# Patient Record
Sex: FEMALE | Race: WHITE | Employment: OTHER | URBAN - METROPOLITAN AREA
[De-identification: names, ages, dates, MRNs, and addresses within clinical notes are randomized per-mention and may not be internally consistent; named-entity substitution may affect disease eponyms.]

---

## 2017-01-19 DIAGNOSIS — Z11.59 NEED FOR HEPATITIS C SCREENING TEST: ICD-10-CM

## 2017-01-19 DIAGNOSIS — E11.9 TYPE 2 DIABETES MELLITUS WITHOUT COMPLICATION, WITHOUT LONG-TERM CURRENT USE OF INSULIN (HCC): ICD-10-CM

## 2017-01-19 LAB
CREATININE URINE: 109.9 MG/DL
HBA1C MFR BLD: 6.2 % (ref 4.8–5.9)
HEPATITIS C ANTIBODY INTERPRETATION: NORMAL
MICROALBUMIN UR-MCNC: <1.2 MG/DL
MICROALBUMIN/CREAT UR-RTO: NORMAL MG/G (ref 0–30)

## 2017-02-28 DIAGNOSIS — Z12.31 ENCOUNTER FOR SCREENING MAMMOGRAM FOR BREAST CANCER: Primary | ICD-10-CM

## 2017-03-03 ENCOUNTER — OFFICE VISIT (OUTPATIENT)
Dept: FAMILY MEDICINE CLINIC | Age: 72
End: 2017-03-03

## 2017-03-03 VITALS
BODY MASS INDEX: 31.08 KG/M2 | RESPIRATION RATE: 18 BRPM | SYSTOLIC BLOOD PRESSURE: 128 MMHG | DIASTOLIC BLOOD PRESSURE: 78 MMHG | HEIGHT: 67 IN | TEMPERATURE: 97.2 F | HEART RATE: 72 BPM | WEIGHT: 198 LBS

## 2017-03-03 DIAGNOSIS — Z23 NEED FOR VACCINATION WITH 13-POLYVALENT PNEUMOCOCCAL CONJUGATE VACCINE: ICD-10-CM

## 2017-03-03 DIAGNOSIS — E11.40 NEUROPATHY DUE TO TYPE 2 DIABETES MELLITUS (HCC): Primary | ICD-10-CM

## 2017-03-03 PROCEDURE — 4040F PNEUMOC VAC/ADMIN/RCVD: CPT | Performed by: FAMILY MEDICINE

## 2017-03-03 PROCEDURE — 1123F ACP DISCUSS/DSCN MKR DOCD: CPT | Performed by: FAMILY MEDICINE

## 2017-03-03 PROCEDURE — 90670 PCV13 VACCINE IM: CPT | Performed by: FAMILY MEDICINE

## 2017-03-03 PROCEDURE — 3014F SCREEN MAMMO DOC REV: CPT | Performed by: FAMILY MEDICINE

## 2017-03-03 PROCEDURE — G8399 PT W/DXA RESULTS DOCUMENT: HCPCS | Performed by: FAMILY MEDICINE

## 2017-03-03 PROCEDURE — G0009 ADMIN PNEUMOCOCCAL VACCINE: HCPCS | Performed by: FAMILY MEDICINE

## 2017-03-03 PROCEDURE — G8417 CALC BMI ABV UP PARAM F/U: HCPCS | Performed by: FAMILY MEDICINE

## 2017-03-03 PROCEDURE — 3044F HG A1C LEVEL LT 7.0%: CPT | Performed by: FAMILY MEDICINE

## 2017-03-03 PROCEDURE — 99213 OFFICE O/P EST LOW 20 MIN: CPT | Performed by: FAMILY MEDICINE

## 2017-03-03 PROCEDURE — 1090F PRES/ABSN URINE INCON ASSESS: CPT | Performed by: FAMILY MEDICINE

## 2017-03-03 PROCEDURE — G8427 DOCREV CUR MEDS BY ELIG CLIN: HCPCS | Performed by: FAMILY MEDICINE

## 2017-03-03 PROCEDURE — 1036F TOBACCO NON-USER: CPT | Performed by: FAMILY MEDICINE

## 2017-03-03 PROCEDURE — 3017F COLORECTAL CA SCREEN DOC REV: CPT | Performed by: FAMILY MEDICINE

## 2017-03-03 PROCEDURE — G8484 FLU IMMUNIZE NO ADMIN: HCPCS | Performed by: FAMILY MEDICINE

## 2017-03-03 RX ORDER — PREGABALIN 75 MG/1
75 CAPSULE ORAL 2 TIMES DAILY
Qty: 60 CAPSULE | Refills: 3 | Status: SHIPPED | OUTPATIENT
Start: 2017-03-03 | End: 2017-03-30 | Stop reason: SDUPTHER

## 2017-03-15 DIAGNOSIS — Z12.31 ENCOUNTER FOR SCREENING MAMMOGRAM FOR BREAST CANCER: ICD-10-CM

## 2017-03-30 ENCOUNTER — OFFICE VISIT (OUTPATIENT)
Dept: FAMILY MEDICINE CLINIC | Age: 72
End: 2017-03-30

## 2017-03-30 VITALS
RESPIRATION RATE: 18 BRPM | BODY MASS INDEX: 31.71 KG/M2 | SYSTOLIC BLOOD PRESSURE: 122 MMHG | WEIGHT: 202 LBS | TEMPERATURE: 97.6 F | HEART RATE: 78 BPM | HEIGHT: 67 IN | DIASTOLIC BLOOD PRESSURE: 70 MMHG

## 2017-03-30 DIAGNOSIS — E03.9 ACQUIRED HYPOTHYROIDISM: ICD-10-CM

## 2017-03-30 DIAGNOSIS — E11.40 NEUROPATHY DUE TO TYPE 2 DIABETES MELLITUS (HCC): Primary | ICD-10-CM

## 2017-03-30 DIAGNOSIS — E11.42 TYPE 2 DIABETES MELLITUS WITH DIABETIC POLYNEUROPATHY, WITHOUT LONG-TERM CURRENT USE OF INSULIN (HCC): ICD-10-CM

## 2017-03-30 DIAGNOSIS — E53.8 VITAMIN B 12 DEFICIENCY: ICD-10-CM

## 2017-03-30 PROCEDURE — 3044F HG A1C LEVEL LT 7.0%: CPT | Performed by: FAMILY MEDICINE

## 2017-03-30 PROCEDURE — 1090F PRES/ABSN URINE INCON ASSESS: CPT | Performed by: FAMILY MEDICINE

## 2017-03-30 PROCEDURE — 3014F SCREEN MAMMO DOC REV: CPT | Performed by: FAMILY MEDICINE

## 2017-03-30 PROCEDURE — 1123F ACP DISCUSS/DSCN MKR DOCD: CPT | Performed by: FAMILY MEDICINE

## 2017-03-30 PROCEDURE — 4040F PNEUMOC VAC/ADMIN/RCVD: CPT | Performed by: FAMILY MEDICINE

## 2017-03-30 PROCEDURE — G8417 CALC BMI ABV UP PARAM F/U: HCPCS | Performed by: FAMILY MEDICINE

## 2017-03-30 PROCEDURE — G8484 FLU IMMUNIZE NO ADMIN: HCPCS | Performed by: FAMILY MEDICINE

## 2017-03-30 PROCEDURE — G8399 PT W/DXA RESULTS DOCUMENT: HCPCS | Performed by: FAMILY MEDICINE

## 2017-03-30 PROCEDURE — 3017F COLORECTAL CA SCREEN DOC REV: CPT | Performed by: FAMILY MEDICINE

## 2017-03-30 PROCEDURE — G8427 DOCREV CUR MEDS BY ELIG CLIN: HCPCS | Performed by: FAMILY MEDICINE

## 2017-03-30 PROCEDURE — 1036F TOBACCO NON-USER: CPT | Performed by: FAMILY MEDICINE

## 2017-03-30 PROCEDURE — 99213 OFFICE O/P EST LOW 20 MIN: CPT | Performed by: FAMILY MEDICINE

## 2017-03-30 RX ORDER — PREGABALIN 150 MG/1
150 CAPSULE ORAL 2 TIMES DAILY
Qty: 30 CAPSULE | Refills: 0 | Status: SHIPPED | OUTPATIENT
Start: 2017-03-30 | End: 2017-07-17 | Stop reason: SDUPTHER

## 2017-03-30 RX ORDER — PREGABALIN 150 MG/1
150 CAPSULE ORAL 2 TIMES DAILY
Qty: 180 CAPSULE | Refills: 3 | Status: SHIPPED | OUTPATIENT
Start: 2017-03-30 | End: 2018-01-08 | Stop reason: SDUPTHER

## 2017-06-23 DIAGNOSIS — E11.40 NEUROPATHY DUE TO TYPE 2 DIABETES MELLITUS (HCC): ICD-10-CM

## 2017-06-23 DIAGNOSIS — E11.42 TYPE 2 DIABETES MELLITUS WITH DIABETIC POLYNEUROPATHY, WITHOUT LONG-TERM CURRENT USE OF INSULIN (HCC): ICD-10-CM

## 2017-06-23 DIAGNOSIS — E03.9 ACQUIRED HYPOTHYROIDISM: ICD-10-CM

## 2017-06-23 DIAGNOSIS — E53.8 VITAMIN B 12 DEFICIENCY: ICD-10-CM

## 2017-06-23 LAB
ALBUMIN SERPL-MCNC: 4.1 G/DL (ref 3.9–4.9)
ALP BLD-CCNC: 90 U/L (ref 40–130)
ALT SERPL-CCNC: 20 U/L (ref 0–33)
ANION GAP SERPL CALCULATED.3IONS-SCNC: 13 MEQ/L (ref 7–13)
AST SERPL-CCNC: 20 U/L (ref 0–35)
BILIRUB SERPL-MCNC: 0.4 MG/DL (ref 0–1.2)
BUN BLDV-MCNC: 17 MG/DL (ref 8–23)
CALCIUM SERPL-MCNC: 9.3 MG/DL (ref 8.6–10.2)
CHLORIDE BLD-SCNC: 101 MEQ/L (ref 98–107)
CHOLESTEROL, TOTAL: 206 MG/DL (ref 0–199)
CO2: 24 MEQ/L (ref 22–29)
CREAT SERPL-MCNC: 0.59 MG/DL (ref 0.5–0.9)
CREATININE URINE: 163.2 MG/DL
GFR AFRICAN AMERICAN: >60
GFR NON-AFRICAN AMERICAN: >60
GLOBULIN: 2.3 G/DL (ref 2.3–3.5)
GLUCOSE BLD-MCNC: 126 MG/DL (ref 74–109)
HBA1C MFR BLD: 6.3 % (ref 4.8–5.9)
HDLC SERPL-MCNC: 45 MG/DL (ref 40–59)
LDL CHOLESTEROL CALCULATED: 126 MG/DL (ref 0–129)
MICROALBUMIN UR-MCNC: <1.2 MG/DL
MICROALBUMIN/CREAT UR-RTO: NORMAL MG/G (ref 0–30)
POTASSIUM SERPL-SCNC: 4.5 MEQ/L (ref 3.5–5.1)
SODIUM BLD-SCNC: 138 MEQ/L (ref 132–144)
T4 FREE: 1.39 NG/DL (ref 0.93–1.7)
TOTAL PROTEIN: 6.4 G/DL (ref 6.4–8.1)
TRIGL SERPL-MCNC: 174 MG/DL (ref 0–200)
TSH SERPL DL<=0.05 MIU/L-ACNC: 2.64 UIU/ML (ref 0.27–4.2)
VITAMIN B-12: 859 PG/ML (ref 211–946)

## 2017-07-17 DIAGNOSIS — E11.40 NEUROPATHY DUE TO TYPE 2 DIABETES MELLITUS (HCC): ICD-10-CM

## 2017-07-17 RX ORDER — PREGABALIN 150 MG/1
150 CAPSULE ORAL 2 TIMES DAILY
Qty: 12 CAPSULE | Refills: 0 | Status: SHIPPED | OUTPATIENT
Start: 2017-07-17 | End: 2018-01-08

## 2017-07-26 RX ORDER — LEVOTHYROXINE SODIUM 88 UG/1
TABLET ORAL
Qty: 90 TABLET | Refills: 3 | Status: SHIPPED | OUTPATIENT
Start: 2017-07-26 | End: 2018-07-16 | Stop reason: SDUPTHER

## 2017-08-03 DIAGNOSIS — Z78.0 POSTMENOPAUSAL: Primary | ICD-10-CM

## 2017-08-07 ENCOUNTER — HOSPITAL ENCOUNTER (OUTPATIENT)
Dept: GENERAL RADIOLOGY | Age: 72
Discharge: HOME | End: 2017-08-07
Payer: MEDICARE

## 2017-08-07 DIAGNOSIS — Z78.0 POSTMENOPAUSAL: ICD-10-CM

## 2017-08-07 PROCEDURE — 77080 DXA BONE DENSITY AXIAL: CPT

## 2017-12-21 ENCOUNTER — TELEPHONE (OUTPATIENT)
Dept: FAMILY MEDICINE CLINIC | Age: 72
End: 2017-12-21

## 2017-12-21 DIAGNOSIS — E11.42 TYPE 2 DIABETES MELLITUS WITH DIABETIC POLYNEUROPATHY, WITHOUT LONG-TERM CURRENT USE OF INSULIN (HCC): Primary | ICD-10-CM

## 2017-12-22 NOTE — TELEPHONE ENCOUNTER
90 day supply sent in on, patient hasn't been seen since March and needs to be seen her to the end of the current 90 day supply.

## 2018-01-08 ENCOUNTER — TELEPHONE (OUTPATIENT)
Dept: FAMILY MEDICINE CLINIC | Age: 73
End: 2018-01-08

## 2018-01-08 DIAGNOSIS — E11.40 NEUROPATHY DUE TO TYPE 2 DIABETES MELLITUS (HCC): ICD-10-CM

## 2018-01-08 RX ORDER — PREGABALIN 150 MG/1
150 CAPSULE ORAL 2 TIMES DAILY
Qty: 180 CAPSULE | Refills: 1 | Status: SHIPPED | OUTPATIENT
Start: 2018-01-08 | End: 2018-07-10 | Stop reason: SDUPTHER

## 2018-01-11 ENCOUNTER — OFFICE VISIT (OUTPATIENT)
Dept: FAMILY MEDICINE CLINIC | Age: 73
End: 2018-01-11

## 2018-01-11 VITALS
DIASTOLIC BLOOD PRESSURE: 68 MMHG | HEIGHT: 67 IN | HEART RATE: 72 BPM | BODY MASS INDEX: 32.33 KG/M2 | WEIGHT: 206 LBS | SYSTOLIC BLOOD PRESSURE: 112 MMHG | RESPIRATION RATE: 12 BRPM | TEMPERATURE: 97.8 F

## 2018-01-11 DIAGNOSIS — E11.42 TYPE 2 DIABETES MELLITUS WITH DIABETIC POLYNEUROPATHY, WITHOUT LONG-TERM CURRENT USE OF INSULIN (HCC): Primary | ICD-10-CM

## 2018-01-11 DIAGNOSIS — M54.12 C6 RADICULOPATHY: ICD-10-CM

## 2018-01-11 DIAGNOSIS — E03.9 ACQUIRED HYPOTHYROIDISM: ICD-10-CM

## 2018-01-11 DIAGNOSIS — E11.42 TYPE 2 DIABETES MELLITUS WITH DIABETIC POLYNEUROPATHY, WITHOUT LONG-TERM CURRENT USE OF INSULIN (HCC): ICD-10-CM

## 2018-01-11 DIAGNOSIS — E53.8 VITAMIN B 12 DEFICIENCY: ICD-10-CM

## 2018-01-11 DIAGNOSIS — K63.5 POLYP OF COLON, UNSPECIFIED PART OF COLON, UNSPECIFIED TYPE: ICD-10-CM

## 2018-01-11 LAB
ALBUMIN SERPL-MCNC: 4.3 G/DL (ref 3.9–4.9)
ALP BLD-CCNC: 92 U/L (ref 40–130)
ALT SERPL-CCNC: 14 U/L (ref 0–33)
ANION GAP SERPL CALCULATED.3IONS-SCNC: 16 MEQ/L (ref 7–13)
AST SERPL-CCNC: 17 U/L (ref 0–35)
BASOPHILS ABSOLUTE: 0 K/UL (ref 0–0.2)
BASOPHILS RELATIVE PERCENT: 0.5 %
BILIRUB SERPL-MCNC: 0.3 MG/DL (ref 0–1.2)
BUN BLDV-MCNC: 15 MG/DL (ref 8–23)
CALCIUM SERPL-MCNC: 9.3 MG/DL (ref 8.6–10.2)
CHLORIDE BLD-SCNC: 103 MEQ/L (ref 98–107)
CHOLESTEROL, TOTAL: 199 MG/DL (ref 0–199)
CO2: 25 MEQ/L (ref 22–29)
CREAT SERPL-MCNC: 0.64 MG/DL (ref 0.5–0.9)
CREATININE URINE: 117.7 MG/DL
EOSINOPHILS ABSOLUTE: 0.1 K/UL (ref 0–0.7)
EOSINOPHILS RELATIVE PERCENT: 1.9 %
GFR AFRICAN AMERICAN: >60
GFR NON-AFRICAN AMERICAN: >60
GLOBULIN: 2.5 G/DL (ref 2.3–3.5)
GLUCOSE BLD-MCNC: 113 MG/DL (ref 74–109)
HBA1C MFR BLD: 6 % (ref 4.8–5.9)
HCT VFR BLD CALC: 40.6 % (ref 37–47)
HDLC SERPL-MCNC: 45 MG/DL (ref 40–59)
HEMOGLOBIN: 13.5 G/DL (ref 12–16)
LDL CHOLESTEROL CALCULATED: 120 MG/DL (ref 0–129)
LYMPHOCYTES ABSOLUTE: 2.6 K/UL (ref 1–4.8)
LYMPHOCYTES RELATIVE PERCENT: 32.2 %
MCH RBC QN AUTO: 28.6 PG (ref 27–31.3)
MCHC RBC AUTO-ENTMCNC: 33.4 % (ref 33–37)
MCV RBC AUTO: 85.6 FL (ref 82–100)
MICROALBUMIN UR-MCNC: <1.2 MG/DL
MICROALBUMIN/CREAT UR-RTO: NORMAL MG/G (ref 0–30)
MONOCYTES ABSOLUTE: 0.8 K/UL (ref 0.2–0.8)
MONOCYTES RELATIVE PERCENT: 9.7 %
NEUTROPHILS ABSOLUTE: 4.4 K/UL (ref 1.4–6.5)
NEUTROPHILS RELATIVE PERCENT: 55.7 %
PDW BLD-RTO: 14 % (ref 11.5–14.5)
PLATELET # BLD: 211 K/UL (ref 130–400)
POTASSIUM SERPL-SCNC: 4.9 MEQ/L (ref 3.5–5.1)
RBC # BLD: 4.74 M/UL (ref 4.2–5.4)
SODIUM BLD-SCNC: 144 MEQ/L (ref 132–144)
T4 FREE: 1.42 NG/DL (ref 0.93–1.7)
TOTAL PROTEIN: 6.8 G/DL (ref 6.4–8.1)
TRIGL SERPL-MCNC: 171 MG/DL (ref 0–200)
TSH SERPL DL<=0.05 MIU/L-ACNC: 3.23 UIU/ML (ref 0.27–4.2)
VITAMIN B-12: 1418 PG/ML (ref 211–946)
WBC # BLD: 7.9 K/UL (ref 4.8–10.8)

## 2018-01-11 PROCEDURE — G8399 PT W/DXA RESULTS DOCUMENT: HCPCS | Performed by: FAMILY MEDICINE

## 2018-01-11 PROCEDURE — G8417 CALC BMI ABV UP PARAM F/U: HCPCS | Performed by: FAMILY MEDICINE

## 2018-01-11 PROCEDURE — 1123F ACP DISCUSS/DSCN MKR DOCD: CPT | Performed by: FAMILY MEDICINE

## 2018-01-11 PROCEDURE — 99214 OFFICE O/P EST MOD 30 MIN: CPT | Performed by: FAMILY MEDICINE

## 2018-01-11 PROCEDURE — 3044F HG A1C LEVEL LT 7.0%: CPT | Performed by: FAMILY MEDICINE

## 2018-01-11 PROCEDURE — 4040F PNEUMOC VAC/ADMIN/RCVD: CPT | Performed by: FAMILY MEDICINE

## 2018-01-11 PROCEDURE — 3014F SCREEN MAMMO DOC REV: CPT | Performed by: FAMILY MEDICINE

## 2018-01-11 PROCEDURE — 3017F COLORECTAL CA SCREEN DOC REV: CPT | Performed by: FAMILY MEDICINE

## 2018-01-11 PROCEDURE — G8427 DOCREV CUR MEDS BY ELIG CLIN: HCPCS | Performed by: FAMILY MEDICINE

## 2018-01-11 PROCEDURE — G8484 FLU IMMUNIZE NO ADMIN: HCPCS | Performed by: FAMILY MEDICINE

## 2018-01-11 PROCEDURE — 1090F PRES/ABSN URINE INCON ASSESS: CPT | Performed by: FAMILY MEDICINE

## 2018-01-11 PROCEDURE — 1036F TOBACCO NON-USER: CPT | Performed by: FAMILY MEDICINE

## 2018-01-11 RX ORDER — MECLIZINE HYDROCHLORIDE 25 MG/1
TABLET ORAL
Refills: 0 | COMMUNITY
Start: 2018-01-03 | End: 2020-12-31 | Stop reason: ALTCHOICE

## 2018-01-11 ASSESSMENT — PATIENT HEALTH QUESTIONNAIRE - PHQ9
1. LITTLE INTEREST OR PLEASURE IN DOING THINGS: 0
2. FEELING DOWN, DEPRESSED OR HOPELESS: 1
SUM OF ALL RESPONSES TO PHQ9 QUESTIONS 1 & 2: 1
SUM OF ALL RESPONSES TO PHQ QUESTIONS 1-9: 1

## 2018-01-11 NOTE — PROGRESS NOTES
diabetic polyneuropathy, without long-term current use of insulin (HCC)  CBC Auto Differential    Comprehensive Metabolic Panel    Hemoglobin A1C    Lipid Panel    Microalbumin / Creatinine Urine Ratio    CBC Auto Differential    Comprehensive Metabolic Panel    Lipid Panel    Hemoglobin A1C    Microalbumin / Creatinine Urine Ratio    Well controlled, continue current medication. 2. Acquired hypothyroidism  T4, Free    TSH without Reflex    TSH without Reflex    T4, Free    Well controlled, continue current medication. 3. Vitamin B 12 deficiency  Vitamin B12    Vitamin B12    Continue with current supplementation, apparently asymptomatic. 4. C6 radiculopathy      Present, continue to treat. 5. Polyp of colon, unspecified part of colon, unspecified type  Ambulatory referral to Gastroenterology    Patient due for colonoscopy. Plan for follow up: Follow up in 4 months with blood work as ordered. Other follow up as needed.       Electronically signed by Imtiaz Perkins, 8:30 PM 1/14/18

## 2018-01-14 PROBLEM — E11.40 NEUROPATHY DUE TO TYPE 2 DIABETES MELLITUS (HCC): Status: RESOLVED | Noted: 2017-03-03 | Resolved: 2018-01-14

## 2018-03-10 DIAGNOSIS — E11.42 TYPE 2 DIABETES MELLITUS WITH DIABETIC POLYNEUROPATHY, WITHOUT LONG-TERM CURRENT USE OF INSULIN (HCC): ICD-10-CM

## 2018-04-09 DIAGNOSIS — Z12.31 ENCOUNTER FOR SCREENING MAMMOGRAM FOR BREAST CANCER: Primary | ICD-10-CM

## 2018-04-11 DIAGNOSIS — Z12.31 ENCOUNTER FOR SCREENING MAMMOGRAM FOR BREAST CANCER: ICD-10-CM

## 2018-04-20 ENCOUNTER — OFFICE VISIT (OUTPATIENT)
Dept: FAMILY MEDICINE CLINIC | Age: 73
End: 2018-04-20
Payer: MEDICARE

## 2018-04-20 VITALS
SYSTOLIC BLOOD PRESSURE: 130 MMHG | RESPIRATION RATE: 12 BRPM | WEIGHT: 211.3 LBS | HEIGHT: 67 IN | DIASTOLIC BLOOD PRESSURE: 76 MMHG | HEART RATE: 63 BPM | TEMPERATURE: 98.4 F | OXYGEN SATURATION: 97 % | BODY MASS INDEX: 33.16 KG/M2

## 2018-04-20 DIAGNOSIS — K29.00 OTHER ACUTE GASTRITIS WITHOUT HEMORRHAGE: Primary | ICD-10-CM

## 2018-04-20 PROCEDURE — 4040F PNEUMOC VAC/ADMIN/RCVD: CPT | Performed by: FAMILY MEDICINE

## 2018-04-20 PROCEDURE — 3017F COLORECTAL CA SCREEN DOC REV: CPT | Performed by: FAMILY MEDICINE

## 2018-04-20 PROCEDURE — G8417 CALC BMI ABV UP PARAM F/U: HCPCS | Performed by: FAMILY MEDICINE

## 2018-04-20 PROCEDURE — 1090F PRES/ABSN URINE INCON ASSESS: CPT | Performed by: FAMILY MEDICINE

## 2018-04-20 PROCEDURE — 1036F TOBACCO NON-USER: CPT | Performed by: FAMILY MEDICINE

## 2018-04-20 PROCEDURE — 99213 OFFICE O/P EST LOW 20 MIN: CPT | Performed by: FAMILY MEDICINE

## 2018-04-20 PROCEDURE — 1123F ACP DISCUSS/DSCN MKR DOCD: CPT | Performed by: FAMILY MEDICINE

## 2018-04-20 PROCEDURE — G8399 PT W/DXA RESULTS DOCUMENT: HCPCS | Performed by: FAMILY MEDICINE

## 2018-04-20 PROCEDURE — G8427 DOCREV CUR MEDS BY ELIG CLIN: HCPCS | Performed by: FAMILY MEDICINE

## 2018-04-20 RX ORDER — PANTOPRAZOLE SODIUM 40 MG/1
40 TABLET, DELAYED RELEASE ORAL DAILY
Qty: 30 TABLET | Refills: 1 | Status: SHIPPED | OUTPATIENT
Start: 2018-04-20 | End: 2018-06-23 | Stop reason: ALTCHOICE

## 2018-05-03 ENCOUNTER — OFFICE VISIT (OUTPATIENT)
Dept: FAMILY MEDICINE CLINIC | Age: 73
End: 2018-05-03
Payer: MEDICARE

## 2018-05-03 VITALS
RESPIRATION RATE: 12 BRPM | BODY MASS INDEX: 32.71 KG/M2 | SYSTOLIC BLOOD PRESSURE: 112 MMHG | DIASTOLIC BLOOD PRESSURE: 70 MMHG | TEMPERATURE: 98.6 F | HEART RATE: 70 BPM | WEIGHT: 208.4 LBS | HEIGHT: 67 IN | OXYGEN SATURATION: 97 %

## 2018-05-03 DIAGNOSIS — E11.42 TYPE 2 DIABETES MELLITUS WITH DIABETIC POLYNEUROPATHY, WITHOUT LONG-TERM CURRENT USE OF INSULIN (HCC): Primary | ICD-10-CM

## 2018-05-03 DIAGNOSIS — R60.9 PERIPHERAL EDEMA: ICD-10-CM

## 2018-05-03 DIAGNOSIS — E03.9 ACQUIRED HYPOTHYROIDISM: ICD-10-CM

## 2018-05-03 DIAGNOSIS — K21.9 GASTROESOPHAGEAL REFLUX DISEASE WITHOUT ESOPHAGITIS: ICD-10-CM

## 2018-05-03 PROCEDURE — G8427 DOCREV CUR MEDS BY ELIG CLIN: HCPCS | Performed by: FAMILY MEDICINE

## 2018-05-03 PROCEDURE — 1123F ACP DISCUSS/DSCN MKR DOCD: CPT | Performed by: FAMILY MEDICINE

## 2018-05-03 PROCEDURE — G8417 CALC BMI ABV UP PARAM F/U: HCPCS | Performed by: FAMILY MEDICINE

## 2018-05-03 PROCEDURE — 3017F COLORECTAL CA SCREEN DOC REV: CPT | Performed by: FAMILY MEDICINE

## 2018-05-03 PROCEDURE — G8399 PT W/DXA RESULTS DOCUMENT: HCPCS | Performed by: FAMILY MEDICINE

## 2018-05-03 PROCEDURE — 4040F PNEUMOC VAC/ADMIN/RCVD: CPT | Performed by: FAMILY MEDICINE

## 2018-05-03 PROCEDURE — 3044F HG A1C LEVEL LT 7.0%: CPT | Performed by: FAMILY MEDICINE

## 2018-05-03 PROCEDURE — 2022F DILAT RTA XM EVC RTNOPTHY: CPT | Performed by: FAMILY MEDICINE

## 2018-05-03 PROCEDURE — 1090F PRES/ABSN URINE INCON ASSESS: CPT | Performed by: FAMILY MEDICINE

## 2018-05-03 PROCEDURE — 1036F TOBACCO NON-USER: CPT | Performed by: FAMILY MEDICINE

## 2018-05-03 PROCEDURE — 99214 OFFICE O/P EST MOD 30 MIN: CPT | Performed by: FAMILY MEDICINE

## 2018-05-03 ASSESSMENT — PATIENT HEALTH QUESTIONNAIRE - PHQ9
1. LITTLE INTEREST OR PLEASURE IN DOING THINGS: 0
2. FEELING DOWN, DEPRESSED OR HOPELESS: 0
SUM OF ALL RESPONSES TO PHQ QUESTIONS 1-9: 0
SUM OF ALL RESPONSES TO PHQ9 QUESTIONS 1 & 2: 0

## 2018-05-05 ASSESSMENT — ENCOUNTER SYMPTOMS
DIARRHEA: 0
BLOOD IN STOOL: 0
SHORTNESS OF BREATH: 0
VOMITING: 0
TROUBLE SWALLOWING: 0
NAUSEA: 0
CONSTIPATION: 0
COUGH: 0
CHEST TIGHTNESS: 0
ABDOMINAL PAIN: 0

## 2018-06-23 ENCOUNTER — OFFICE VISIT (OUTPATIENT)
Dept: FAMILY MEDICINE CLINIC | Age: 73
End: 2018-06-23
Payer: MEDICARE

## 2018-06-23 VITALS
WEIGHT: 208.4 LBS | SYSTOLIC BLOOD PRESSURE: 130 MMHG | TEMPERATURE: 96.7 F | RESPIRATION RATE: 16 BRPM | OXYGEN SATURATION: 97 % | DIASTOLIC BLOOD PRESSURE: 74 MMHG | HEIGHT: 67 IN | HEART RATE: 72 BPM | BODY MASS INDEX: 32.71 KG/M2

## 2018-06-23 DIAGNOSIS — M54.31 SCIATICA OF RIGHT SIDE: Primary | ICD-10-CM

## 2018-06-23 DIAGNOSIS — K29.00 OTHER ACUTE GASTRITIS WITHOUT HEMORRHAGE: ICD-10-CM

## 2018-06-23 PROCEDURE — G8417 CALC BMI ABV UP PARAM F/U: HCPCS | Performed by: NURSE PRACTITIONER

## 2018-06-23 PROCEDURE — G8427 DOCREV CUR MEDS BY ELIG CLIN: HCPCS | Performed by: NURSE PRACTITIONER

## 2018-06-23 PROCEDURE — 4040F PNEUMOC VAC/ADMIN/RCVD: CPT | Performed by: NURSE PRACTITIONER

## 2018-06-23 PROCEDURE — G8399 PT W/DXA RESULTS DOCUMENT: HCPCS | Performed by: NURSE PRACTITIONER

## 2018-06-23 PROCEDURE — 99213 OFFICE O/P EST LOW 20 MIN: CPT | Performed by: NURSE PRACTITIONER

## 2018-06-23 PROCEDURE — 1123F ACP DISCUSS/DSCN MKR DOCD: CPT | Performed by: NURSE PRACTITIONER

## 2018-06-23 PROCEDURE — 1090F PRES/ABSN URINE INCON ASSESS: CPT | Performed by: NURSE PRACTITIONER

## 2018-06-23 PROCEDURE — 1036F TOBACCO NON-USER: CPT | Performed by: NURSE PRACTITIONER

## 2018-06-23 PROCEDURE — 3017F COLORECTAL CA SCREEN DOC REV: CPT | Performed by: NURSE PRACTITIONER

## 2018-06-23 RX ORDER — PREDNISONE 20 MG/1
TABLET ORAL
Qty: 3 TABLET | Refills: 0 | Status: SHIPPED | OUTPATIENT
Start: 2018-06-23 | End: 2018-08-14 | Stop reason: ALTCHOICE

## 2018-06-23 RX ORDER — PANTOPRAZOLE SODIUM 40 MG/1
40 TABLET, DELAYED RELEASE ORAL DAILY
Qty: 30 TABLET | Refills: 1 | Status: SHIPPED | OUTPATIENT
Start: 2018-06-23 | End: 2019-01-22 | Stop reason: SDUPTHER

## 2018-06-23 RX ORDER — BACLOFEN 10 MG/1
5 TABLET ORAL 3 TIMES DAILY PRN
Qty: 5 TABLET | Refills: 0 | Status: SHIPPED | OUTPATIENT
Start: 2018-06-23 | End: 2020-12-31 | Stop reason: ALTCHOICE

## 2018-06-23 ASSESSMENT — ENCOUNTER SYMPTOMS: BACK PAIN: 1

## 2018-07-10 DIAGNOSIS — E11.40 NEUROPATHY DUE TO TYPE 2 DIABETES MELLITUS (HCC): ICD-10-CM

## 2018-07-12 RX ORDER — PREGABALIN 150 MG/1
150 CAPSULE ORAL 2 TIMES DAILY
Qty: 180 CAPSULE | Refills: 1 | Status: SHIPPED | OUTPATIENT
Start: 2018-07-12 | End: 2019-01-22 | Stop reason: SDUPTHER

## 2018-07-17 RX ORDER — LEVOTHYROXINE SODIUM 88 UG/1
TABLET ORAL
Qty: 90 TABLET | Refills: 0 | Status: SHIPPED | OUTPATIENT
Start: 2018-07-17 | End: 2018-10-16 | Stop reason: SDUPTHER

## 2018-07-17 NOTE — TELEPHONE ENCOUNTER
PHARMACY REQUESTING REFILL PATIENT LAST SEEN BY YOU 5/3/18  PLEASE APPROVE OR DENY.      Future Appointments  Date Time Provider Adry Acosta   7/27/2018 9:15 AM SCHEDULE, LAB GIANNI Mcallister PCP [facility]   8/3/2018 10:30 AM Eugenio Pascual MD [facility]

## 2018-07-20 LAB — DIABETIC RETINOPATHY: POSITIVE

## 2018-07-27 DIAGNOSIS — E53.8 VITAMIN B 12 DEFICIENCY: ICD-10-CM

## 2018-07-27 DIAGNOSIS — E03.9 ACQUIRED HYPOTHYROIDISM: ICD-10-CM

## 2018-07-27 DIAGNOSIS — E11.42 TYPE 2 DIABETES MELLITUS WITH DIABETIC POLYNEUROPATHY, WITHOUT LONG-TERM CURRENT USE OF INSULIN (HCC): ICD-10-CM

## 2018-07-27 LAB
ALBUMIN SERPL-MCNC: 4.3 G/DL (ref 3.9–4.9)
ALP BLD-CCNC: 95 U/L (ref 40–130)
ALT SERPL-CCNC: 21 U/L (ref 0–33)
ANION GAP SERPL CALCULATED.3IONS-SCNC: 14 MEQ/L (ref 7–13)
AST SERPL-CCNC: 24 U/L (ref 0–35)
BASOPHILS ABSOLUTE: 0 K/UL (ref 0–0.2)
BASOPHILS RELATIVE PERCENT: 0.6 %
BILIRUB SERPL-MCNC: 0.4 MG/DL (ref 0–1.2)
BUN BLDV-MCNC: 14 MG/DL (ref 8–23)
CALCIUM SERPL-MCNC: 9.4 MG/DL (ref 8.6–10.2)
CHLORIDE BLD-SCNC: 103 MEQ/L (ref 98–107)
CHOLESTEROL, TOTAL: 189 MG/DL (ref 0–199)
CO2: 24 MEQ/L (ref 22–29)
CREAT SERPL-MCNC: 0.61 MG/DL (ref 0.5–0.9)
CREATININE URINE: 194.3 MG/DL
EOSINOPHILS ABSOLUTE: 0.2 K/UL (ref 0–0.7)
EOSINOPHILS RELATIVE PERCENT: 3.8 %
GFR AFRICAN AMERICAN: >60
GFR NON-AFRICAN AMERICAN: >60
GLOBULIN: 2.5 G/DL (ref 2.3–3.5)
GLUCOSE BLD-MCNC: 107 MG/DL (ref 74–109)
HBA1C MFR BLD: 6.1 % (ref 4.8–5.9)
HCT VFR BLD CALC: 41.2 % (ref 37–47)
HDLC SERPL-MCNC: 41 MG/DL (ref 40–59)
HEMOGLOBIN: 14 G/DL (ref 12–16)
LDL CHOLESTEROL CALCULATED: 119 MG/DL (ref 0–129)
LYMPHOCYTES ABSOLUTE: 2 K/UL (ref 1–4.8)
LYMPHOCYTES RELATIVE PERCENT: 33.6 %
MCH RBC QN AUTO: 28.6 PG (ref 27–31.3)
MCHC RBC AUTO-ENTMCNC: 33.9 % (ref 33–37)
MCV RBC AUTO: 84.4 FL (ref 82–100)
MICROALBUMIN UR-MCNC: <1.2 MG/DL
MICROALBUMIN/CREAT UR-RTO: NORMAL MG/G (ref 0–30)
MONOCYTES ABSOLUTE: 0.6 K/UL (ref 0.2–0.8)
MONOCYTES RELATIVE PERCENT: 9.5 %
NEUTROPHILS ABSOLUTE: 3.2 K/UL (ref 1.4–6.5)
NEUTROPHILS RELATIVE PERCENT: 52.5 %
PDW BLD-RTO: 14.1 % (ref 11.5–14.5)
PLATELET # BLD: 220 K/UL (ref 130–400)
POTASSIUM SERPL-SCNC: 4.4 MEQ/L (ref 3.5–5.1)
RBC # BLD: 4.88 M/UL (ref 4.2–5.4)
SODIUM BLD-SCNC: 141 MEQ/L (ref 132–144)
T4 FREE: 1.37 NG/DL (ref 0.93–1.7)
TOTAL PROTEIN: 6.8 G/DL (ref 6.4–8.1)
TRIGL SERPL-MCNC: 145 MG/DL (ref 0–200)
TSH SERPL DL<=0.05 MIU/L-ACNC: 2.92 UIU/ML (ref 0.27–4.2)
VITAMIN B-12: 1121 PG/ML (ref 232–1245)
WBC # BLD: 6.1 K/UL (ref 4.8–10.8)

## 2018-08-14 ENCOUNTER — OFFICE VISIT (OUTPATIENT)
Dept: FAMILY MEDICINE CLINIC | Age: 73
End: 2018-08-14
Payer: MEDICARE

## 2018-08-14 VITALS
RESPIRATION RATE: 12 BRPM | TEMPERATURE: 98.6 F | SYSTOLIC BLOOD PRESSURE: 134 MMHG | WEIGHT: 207 LBS | HEART RATE: 67 BPM | BODY MASS INDEX: 32.49 KG/M2 | OXYGEN SATURATION: 96 % | DIASTOLIC BLOOD PRESSURE: 78 MMHG | HEIGHT: 67 IN

## 2018-08-14 DIAGNOSIS — E11.42 TYPE 2 DIABETES MELLITUS WITH DIABETIC POLYNEUROPATHY, WITHOUT LONG-TERM CURRENT USE OF INSULIN (HCC): Primary | ICD-10-CM

## 2018-08-14 DIAGNOSIS — K21.9 GASTROESOPHAGEAL REFLUX DISEASE WITHOUT ESOPHAGITIS: ICD-10-CM

## 2018-08-14 DIAGNOSIS — Z23 NEED FOR PNEUMOCOCCAL VACCINATION: ICD-10-CM

## 2018-08-14 DIAGNOSIS — E03.9 ACQUIRED HYPOTHYROIDISM: ICD-10-CM

## 2018-08-14 PROCEDURE — 1123F ACP DISCUSS/DSCN MKR DOCD: CPT | Performed by: FAMILY MEDICINE

## 2018-08-14 PROCEDURE — 1101F PT FALLS ASSESS-DOCD LE1/YR: CPT | Performed by: FAMILY MEDICINE

## 2018-08-14 PROCEDURE — 2022F DILAT RTA XM EVC RTNOPTHY: CPT | Performed by: FAMILY MEDICINE

## 2018-08-14 PROCEDURE — 1090F PRES/ABSN URINE INCON ASSESS: CPT | Performed by: FAMILY MEDICINE

## 2018-08-14 PROCEDURE — 90732 PPSV23 VACC 2 YRS+ SUBQ/IM: CPT | Performed by: FAMILY MEDICINE

## 2018-08-14 PROCEDURE — G8399 PT W/DXA RESULTS DOCUMENT: HCPCS | Performed by: FAMILY MEDICINE

## 2018-08-14 PROCEDURE — 3044F HG A1C LEVEL LT 7.0%: CPT | Performed by: FAMILY MEDICINE

## 2018-08-14 PROCEDURE — 1036F TOBACCO NON-USER: CPT | Performed by: FAMILY MEDICINE

## 2018-08-14 PROCEDURE — G8427 DOCREV CUR MEDS BY ELIG CLIN: HCPCS | Performed by: FAMILY MEDICINE

## 2018-08-14 PROCEDURE — G0009 ADMIN PNEUMOCOCCAL VACCINE: HCPCS | Performed by: FAMILY MEDICINE

## 2018-08-14 PROCEDURE — G8417 CALC BMI ABV UP PARAM F/U: HCPCS | Performed by: FAMILY MEDICINE

## 2018-08-14 PROCEDURE — 3017F COLORECTAL CA SCREEN DOC REV: CPT | Performed by: FAMILY MEDICINE

## 2018-08-14 PROCEDURE — 99214 OFFICE O/P EST MOD 30 MIN: CPT | Performed by: FAMILY MEDICINE

## 2018-08-14 PROCEDURE — 4040F PNEUMOC VAC/ADMIN/RCVD: CPT | Performed by: FAMILY MEDICINE

## 2018-08-14 PROCEDURE — G8510 SCR DEP NEG, NO PLAN REQD: HCPCS | Performed by: FAMILY MEDICINE

## 2018-08-14 ASSESSMENT — PATIENT HEALTH QUESTIONNAIRE - PHQ9
SUM OF ALL RESPONSES TO PHQ9 QUESTIONS 1 & 2: 0
SUM OF ALL RESPONSES TO PHQ QUESTIONS 1-9: 0
SUM OF ALL RESPONSES TO PHQ QUESTIONS 1-9: 0
1. LITTLE INTEREST OR PLEASURE IN DOING THINGS: 0
2. FEELING DOWN, DEPRESSED OR HOPELESS: 0

## 2018-08-14 NOTE — PROGRESS NOTES
Diabetes Mellitus Type 2: Current symptoms/problems include none and neuropathy. Home blood sugar records: tests sometime  Any episodes of hypoglycemia? no while taking metformin 500 mg twice a day. Known diabetic complications: neuropathy. She is on Lyrica 150 mg twice a day for some neuropathy which seems to be helping. Patient complains that in the evening her ankles and legs swell. Hypothyroidism is asymptomatic and blood work looks good so we will continue levothyroxine 88 µg daily. Gastroesophageal reflux disease is asymptomatic as long she takes pantoprazole, if she misses pantoprazole she knows it. Need for Pneumovax vaccination which we will provide today. Blood pressure less than 131/81,   BP Readings from Last 1 Encounters:   08/14/18 134/78       Social history: This pt is not a smoker. This pt does not take an aspirin a day. Last eye exam was 2017  Last diabetic foot exam was 5/2018   Lab results for chronic conditions:    GFR Non- (no units)   Date Value   07/27/2018 >60.0   01/11/2018 >60.0   06/23/2017 >60.0     No results found for: GFR  Cholesterol, Total (mg/dL)   Date Value   07/27/2018 189   01/11/2018 199   06/23/2017 206 (H)     Triglycerides (mg/dL)   Date Value   07/27/2018 145   01/11/2018 171   06/23/2017 174     HDL (mg/dL)   Date Value   07/27/2018 41   01/11/2018 45   06/23/2017 45     LDL Calculated (mg/dL)   Date Value   07/27/2018 119   01/11/2018 120   06/23/2017 126     TSH (uIU/mL)   Date Value   07/27/2018 2.920   01/11/2018 3.230   06/23/2017 2.640     Hemoglobin A1C (%)   Date Value   07/27/2018 6.1 (H)   01/11/2018 6.0 (H)   06/23/2017 6.3 (H)     Microalbumin, Random Urine (mg/dL)   Date Value   07/27/2018 <1.20     Review of Systems   HENT: Negative for congestion and trouble swallowing. Respiratory: Negative for cough, chest tightness and shortness of breath. Cardiovascular: Negative for chest pain, palpitations and leg swelling.

## 2018-08-15 ENCOUNTER — OFFICE VISIT (OUTPATIENT)
Dept: FAMILY MEDICINE CLINIC | Age: 73
End: 2018-08-15
Payer: MEDICARE

## 2018-08-15 VITALS
HEART RATE: 89 BPM | DIASTOLIC BLOOD PRESSURE: 70 MMHG | OXYGEN SATURATION: 97 % | SYSTOLIC BLOOD PRESSURE: 120 MMHG | TEMPERATURE: 99.9 F | RESPIRATION RATE: 16 BRPM | WEIGHT: 207 LBS | BODY MASS INDEX: 32.49 KG/M2 | HEIGHT: 67 IN

## 2018-08-15 DIAGNOSIS — T80.90XA INJECTION SITE REACTION, INITIAL ENCOUNTER: Primary | ICD-10-CM

## 2018-08-15 PROCEDURE — 1036F TOBACCO NON-USER: CPT | Performed by: NURSE PRACTITIONER

## 2018-08-15 PROCEDURE — 99213 OFFICE O/P EST LOW 20 MIN: CPT | Performed by: NURSE PRACTITIONER

## 2018-08-15 PROCEDURE — G8417 CALC BMI ABV UP PARAM F/U: HCPCS | Performed by: NURSE PRACTITIONER

## 2018-08-15 PROCEDURE — G8427 DOCREV CUR MEDS BY ELIG CLIN: HCPCS | Performed by: NURSE PRACTITIONER

## 2018-08-15 PROCEDURE — 4040F PNEUMOC VAC/ADMIN/RCVD: CPT | Performed by: NURSE PRACTITIONER

## 2018-08-15 PROCEDURE — G8399 PT W/DXA RESULTS DOCUMENT: HCPCS | Performed by: NURSE PRACTITIONER

## 2018-08-15 PROCEDURE — 1123F ACP DISCUSS/DSCN MKR DOCD: CPT | Performed by: NURSE PRACTITIONER

## 2018-08-15 PROCEDURE — 1101F PT FALLS ASSESS-DOCD LE1/YR: CPT | Performed by: NURSE PRACTITIONER

## 2018-08-15 PROCEDURE — 1090F PRES/ABSN URINE INCON ASSESS: CPT | Performed by: NURSE PRACTITIONER

## 2018-08-15 PROCEDURE — 3017F COLORECTAL CA SCREEN DOC REV: CPT | Performed by: NURSE PRACTITIONER

## 2018-08-15 RX ORDER — METHYLPREDNISOLONE 4 MG/1
TABLET ORAL
Qty: 1 KIT | Refills: 0 | Status: SHIPPED | OUTPATIENT
Start: 2018-08-15 | End: 2018-10-09 | Stop reason: ALTCHOICE

## 2018-08-15 ASSESSMENT — ENCOUNTER SYMPTOMS
BLOOD IN STOOL: 0
SHORTNESS OF BREATH: 0
CONSTIPATION: 0
VOMITING: 0
TROUBLE SWALLOWING: 0
CHEST TIGHTNESS: 0
NAUSEA: 0
ABDOMINAL PAIN: 0
COUGH: 0
DIARRHEA: 0

## 2018-10-09 ENCOUNTER — OFFICE VISIT (OUTPATIENT)
Dept: FAMILY MEDICINE CLINIC | Age: 73
End: 2018-10-09
Payer: MEDICARE

## 2018-10-09 VITALS
HEIGHT: 67 IN | OXYGEN SATURATION: 95 % | RESPIRATION RATE: 14 BRPM | HEART RATE: 73 BPM | BODY MASS INDEX: 32.44 KG/M2 | WEIGHT: 206.7 LBS | SYSTOLIC BLOOD PRESSURE: 132 MMHG | TEMPERATURE: 98.9 F | DIASTOLIC BLOOD PRESSURE: 78 MMHG

## 2018-10-09 DIAGNOSIS — Z23 NEED FOR IMMUNIZATION AGAINST INFLUENZA: ICD-10-CM

## 2018-10-09 DIAGNOSIS — H61.23 BILATERAL IMPACTED CERUMEN: Primary | ICD-10-CM

## 2018-10-09 PROCEDURE — 90662 IIV NO PRSV INCREASED AG IM: CPT | Performed by: FAMILY MEDICINE

## 2018-10-09 PROCEDURE — 1101F PT FALLS ASSESS-DOCD LE1/YR: CPT | Performed by: FAMILY MEDICINE

## 2018-10-09 PROCEDURE — 4040F PNEUMOC VAC/ADMIN/RCVD: CPT | Performed by: FAMILY MEDICINE

## 2018-10-09 PROCEDURE — 3017F COLORECTAL CA SCREEN DOC REV: CPT | Performed by: FAMILY MEDICINE

## 2018-10-09 PROCEDURE — 1036F TOBACCO NON-USER: CPT | Performed by: FAMILY MEDICINE

## 2018-10-09 PROCEDURE — 99213 OFFICE O/P EST LOW 20 MIN: CPT | Performed by: FAMILY MEDICINE

## 2018-10-09 PROCEDURE — 1090F PRES/ABSN URINE INCON ASSESS: CPT | Performed by: FAMILY MEDICINE

## 2018-10-09 PROCEDURE — G8427 DOCREV CUR MEDS BY ELIG CLIN: HCPCS | Performed by: FAMILY MEDICINE

## 2018-10-09 PROCEDURE — G8399 PT W/DXA RESULTS DOCUMENT: HCPCS | Performed by: FAMILY MEDICINE

## 2018-10-09 PROCEDURE — G0008 ADMIN INFLUENZA VIRUS VAC: HCPCS | Performed by: FAMILY MEDICINE

## 2018-10-09 PROCEDURE — G8417 CALC BMI ABV UP PARAM F/U: HCPCS | Performed by: FAMILY MEDICINE

## 2018-10-09 PROCEDURE — 1123F ACP DISCUSS/DSCN MKR DOCD: CPT | Performed by: FAMILY MEDICINE

## 2018-10-09 PROCEDURE — G8482 FLU IMMUNIZE ORDER/ADMIN: HCPCS | Performed by: FAMILY MEDICINE

## 2018-10-09 NOTE — PROGRESS NOTES
Vaccine Information Sheet, \"Influenza - Inactivated\" OR \"Live - Intranasal\"  given to Visuu. Patient responses:    Have you ever had a reaction to a flu vaccine? No  Are you able to eat eggs without adverse effects? Yes  Do you have any current illness? No  Have you ever had Guillian Cuney Syndrome? No    Flu vaccine given per order. Please see immunization tab.

## 2018-10-16 RX ORDER — LEVOTHYROXINE SODIUM 88 UG/1
TABLET ORAL
Qty: 90 TABLET | Refills: 3 | Status: SHIPPED | OUTPATIENT
Start: 2018-10-16

## 2018-10-29 ENCOUNTER — TELEPHONE (OUTPATIENT)
Dept: FAMILY MEDICINE CLINIC | Age: 73
End: 2018-10-29

## 2018-12-08 DIAGNOSIS — E03.9 ACQUIRED HYPOTHYROIDISM: ICD-10-CM

## 2018-12-08 DIAGNOSIS — E11.42 TYPE 2 DIABETES MELLITUS WITH DIABETIC POLYNEUROPATHY, WITHOUT LONG-TERM CURRENT USE OF INSULIN (HCC): ICD-10-CM

## 2018-12-08 LAB
ALBUMIN SERPL-MCNC: 4.3 G/DL (ref 3.9–4.9)
ALP BLD-CCNC: 84 U/L (ref 40–130)
ALT SERPL-CCNC: 15 U/L (ref 0–33)
ANION GAP SERPL CALCULATED.3IONS-SCNC: 12 MEQ/L (ref 7–13)
AST SERPL-CCNC: 19 U/L (ref 0–35)
BASOPHILS ABSOLUTE: 0 K/UL (ref 0–0.2)
BASOPHILS RELATIVE PERCENT: 0.4 %
BILIRUB SERPL-MCNC: 0.5 MG/DL (ref 0–1.2)
BUN BLDV-MCNC: 13 MG/DL (ref 8–23)
CALCIUM SERPL-MCNC: 9.1 MG/DL (ref 8.6–10.2)
CHLORIDE BLD-SCNC: 103 MEQ/L (ref 98–107)
CHOLESTEROL, TOTAL: 193 MG/DL (ref 0–199)
CO2: 26 MEQ/L (ref 22–29)
CREAT SERPL-MCNC: 0.72 MG/DL (ref 0.5–0.9)
CREATININE URINE: 225.3 MG/DL
EOSINOPHILS ABSOLUTE: 0.1 K/UL (ref 0–0.7)
EOSINOPHILS RELATIVE PERCENT: 1 %
GFR AFRICAN AMERICAN: >60
GFR NON-AFRICAN AMERICAN: >60
GLOBULIN: 2.6 G/DL (ref 2.3–3.5)
GLUCOSE BLD-MCNC: 115 MG/DL (ref 74–109)
HBA1C MFR BLD: 5.9 % (ref 4.8–5.9)
HCT VFR BLD CALC: 39.9 % (ref 37–47)
HDLC SERPL-MCNC: 52 MG/DL (ref 40–59)
HEMOGLOBIN: 13.8 G/DL (ref 12–16)
LDL CHOLESTEROL CALCULATED: 108 MG/DL (ref 0–129)
LYMPHOCYTES ABSOLUTE: 1.9 K/UL (ref 1–4.8)
LYMPHOCYTES RELATIVE PERCENT: 27.8 %
MCH RBC QN AUTO: 29.2 PG (ref 27–31.3)
MCHC RBC AUTO-ENTMCNC: 34.7 % (ref 33–37)
MCV RBC AUTO: 84.1 FL (ref 82–100)
MICROALBUMIN UR-MCNC: 1.3 MG/DL
MICROALBUMIN/CREAT UR-RTO: 5.8 MG/G (ref 0–30)
MONOCYTES ABSOLUTE: 0.6 K/UL (ref 0.2–0.8)
MONOCYTES RELATIVE PERCENT: 9 %
NEUTROPHILS ABSOLUTE: 4.3 K/UL (ref 1.4–6.5)
NEUTROPHILS RELATIVE PERCENT: 61.8 %
PDW BLD-RTO: 14 % (ref 11.5–14.5)
PLATELET # BLD: 229 K/UL (ref 130–400)
POTASSIUM SERPL-SCNC: 4.2 MEQ/L (ref 3.5–5.1)
RBC # BLD: 4.74 M/UL (ref 4.2–5.4)
SODIUM BLD-SCNC: 141 MEQ/L (ref 132–144)
TOTAL PROTEIN: 6.9 G/DL (ref 6.4–8.1)
TRIGL SERPL-MCNC: 166 MG/DL (ref 0–200)
TSH SERPL DL<=0.05 MIU/L-ACNC: 2.09 UIU/ML (ref 0.27–4.2)
WBC # BLD: 6.9 K/UL (ref 4.8–10.8)

## 2018-12-17 ENCOUNTER — OFFICE VISIT (OUTPATIENT)
Dept: FAMILY MEDICINE CLINIC | Age: 73
End: 2018-12-17
Payer: MEDICARE

## 2018-12-17 VITALS
RESPIRATION RATE: 16 BRPM | OXYGEN SATURATION: 96 % | HEIGHT: 67 IN | BODY MASS INDEX: 31.36 KG/M2 | HEART RATE: 74 BPM | DIASTOLIC BLOOD PRESSURE: 70 MMHG | WEIGHT: 199.8 LBS | SYSTOLIC BLOOD PRESSURE: 122 MMHG | TEMPERATURE: 98.2 F

## 2018-12-17 DIAGNOSIS — E03.9 ACQUIRED HYPOTHYROIDISM: ICD-10-CM

## 2018-12-17 DIAGNOSIS — E11.42 TYPE 2 DIABETES MELLITUS WITH DIABETIC POLYNEUROPATHY, WITHOUT LONG-TERM CURRENT USE OF INSULIN (HCC): Primary | ICD-10-CM

## 2018-12-17 DIAGNOSIS — K21.9 GASTROESOPHAGEAL REFLUX DISEASE WITHOUT ESOPHAGITIS: ICD-10-CM

## 2018-12-17 PROCEDURE — 3044F HG A1C LEVEL LT 7.0%: CPT | Performed by: FAMILY MEDICINE

## 2018-12-17 PROCEDURE — 2022F DILAT RTA XM EVC RTNOPTHY: CPT | Performed by: FAMILY MEDICINE

## 2018-12-17 PROCEDURE — 99214 OFFICE O/P EST MOD 30 MIN: CPT | Performed by: FAMILY MEDICINE

## 2018-12-17 PROCEDURE — G0444 DEPRESSION SCREEN ANNUAL: HCPCS | Performed by: FAMILY MEDICINE

## 2018-12-17 PROCEDURE — 4040F PNEUMOC VAC/ADMIN/RCVD: CPT | Performed by: FAMILY MEDICINE

## 2018-12-17 PROCEDURE — G8399 PT W/DXA RESULTS DOCUMENT: HCPCS | Performed by: FAMILY MEDICINE

## 2018-12-17 PROCEDURE — G8482 FLU IMMUNIZE ORDER/ADMIN: HCPCS | Performed by: FAMILY MEDICINE

## 2018-12-17 PROCEDURE — 1101F PT FALLS ASSESS-DOCD LE1/YR: CPT | Performed by: FAMILY MEDICINE

## 2018-12-17 PROCEDURE — 1123F ACP DISCUSS/DSCN MKR DOCD: CPT | Performed by: FAMILY MEDICINE

## 2018-12-17 PROCEDURE — G8427 DOCREV CUR MEDS BY ELIG CLIN: HCPCS | Performed by: FAMILY MEDICINE

## 2018-12-17 PROCEDURE — 1036F TOBACCO NON-USER: CPT | Performed by: FAMILY MEDICINE

## 2018-12-17 PROCEDURE — G8417 CALC BMI ABV UP PARAM F/U: HCPCS | Performed by: FAMILY MEDICINE

## 2018-12-17 PROCEDURE — 1090F PRES/ABSN URINE INCON ASSESS: CPT | Performed by: FAMILY MEDICINE

## 2018-12-17 PROCEDURE — 3017F COLORECTAL CA SCREEN DOC REV: CPT | Performed by: FAMILY MEDICINE

## 2018-12-17 ASSESSMENT — PATIENT HEALTH QUESTIONNAIRE - PHQ9
SUM OF ALL RESPONSES TO PHQ QUESTIONS 1-9: 13
8. MOVING OR SPEAKING SO SLOWLY THAT OTHER PEOPLE COULD HAVE NOTICED. OR THE OPPOSITE, BEING SO FIGETY OR RESTLESS THAT YOU HAVE BEEN MOVING AROUND A LOT MORE THAN USUAL: 0
9. THOUGHTS THAT YOU WOULD BE BETTER OFF DEAD, OR OF HURTING YOURSELF: 0
2. FEELING DOWN, DEPRESSED OR HOPELESS: 3
1. LITTLE INTEREST OR PLEASURE IN DOING THINGS: 2
3. TROUBLE FALLING OR STAYING ASLEEP: 3
7. TROUBLE CONCENTRATING ON THINGS, SUCH AS READING THE NEWSPAPER OR WATCHING TELEVISION: 1
5. POOR APPETITE OR OVEREATING: 2
SUM OF ALL RESPONSES TO PHQ QUESTIONS 1-9: 13
10. IF YOU CHECKED OFF ANY PROBLEMS, HOW DIFFICULT HAVE THESE PROBLEMS MADE IT FOR YOU TO DO YOUR WORK, TAKE CARE OF THINGS AT HOME, OR GET ALONG WITH OTHER PEOPLE: 0
4. FEELING TIRED OR HAVING LITTLE ENERGY: 2
SUM OF ALL RESPONSES TO PHQ9 QUESTIONS 1 & 2: 5
6. FEELING BAD ABOUT YOURSELF - OR THAT YOU ARE A FAILURE OR HAVE LET YOURSELF OR YOUR FAMILY DOWN: 0

## 2018-12-17 ASSESSMENT — ENCOUNTER SYMPTOMS
CHEST TIGHTNESS: 0
COUGH: 0
ABDOMINAL PAIN: 0
BLOOD IN STOOL: 0
SHORTNESS OF BREATH: 0
CONSTIPATION: 0
DIARRHEA: 0
VOMITING: 0
NAUSEA: 0

## 2018-12-17 NOTE — PROGRESS NOTES
Diabetes Mellitus Type 2: Current symptoms/problems include none. Home blood sugar records: patient tests 2 time(s) per day  Any episodes of hypoglycemia? no  Known diabetic complications: none including no side effects on metformin 500 mg twice a day. Hypothyroidism is well controlled on levothyroxine 88 µg daily. Gastroesophageal reflux disease is well controlled on pantoprazole 40 mg daily. Positive depression screening although patient is grieving the recent loss of her . Blood pressure less than 131/81,   BP Readings from Last 1 Encounters:   12/17/18 122/70       Social history: This pt is not a smoker. This pt does not take an aspirin a day. Last eye exam was 11/2017  Last diabetic foot exam was 5/2018   Lab results for chronic conditions:    GFR Non- (no units)   Date Value   12/08/2018 >60.0   07/27/2018 >60.0   01/11/2018 >60.0     No results found for: GFR  Cholesterol, Total (mg/dL)   Date Value   12/08/2018 193   07/27/2018 189   01/11/2018 199     Triglycerides (mg/dL)   Date Value   12/08/2018 166   07/27/2018 145   01/11/2018 171     HDL (mg/dL)   Date Value   12/08/2018 52   07/27/2018 41   01/11/2018 45     LDL Calculated (mg/dL)   Date Value   12/08/2018 108   07/27/2018 119   01/11/2018 120     TSH (uIU/mL)   Date Value   12/08/2018 2.090   07/27/2018 2.920   01/11/2018 3.230     Hemoglobin A1C (%)   Date Value   12/08/2018 5.9   07/27/2018 6.1 (H)   01/11/2018 6.0 (H)     Microalbumin, Random Urine (mg/dL)   Date Value   12/08/2018 1.30     Review of Systems   Respiratory: Negative for cough, chest tightness and shortness of breath. Cardiovascular: Negative for chest pain, palpitations and leg swelling. Gastrointestinal: Negative for abdominal pain, blood in stool, constipation, diarrhea, nausea and vomiting. Neurological: Negative for dizziness and light-headedness.    The patient reports no heat or cold intolerance, good energy levels and no hair loss or excessive skin dryness. Diabetes Counseling   Patient was counseled regarding disease risks and adopting healthy behaviors. Patient was provided education materials to assist with self management. Patient was provided log (or received log during previous visit) to record blood pressure, food intake and/or blood sugar. Patient was instructed to keep log up-to-date and to always bring log to all office visits. EXAM:  Constitutional Blood pressure 122/70, pulse 74, temperature 98.2 °F (36.8 °C), temperature source Temporal, resp. rate 16, height 5' 6.5\" (1.689 m), weight 199 lb 12.8 oz (90.6 kg), last menstrual period 12/17/1984, SpO2 96 %. Physical Exam   Constitutional: She appears well-developed and well-nourished. Neck: Normal range of motion. Neck supple. No thyromegaly present. Cardiovascular: Normal rate, regular rhythm and normal heart sounds. Pulmonary/Chest: Effort normal and breath sounds normal.   Abdominal: Bowel sounds are normal. She exhibits no distension. This patient is obese. Musculoskeletal:   There is no costovertebral angle tenderness. Lumbar spine and sacroiliac joints are non tender. There is no edema in the four extremities. Pulses palpable at both posterior tibial and radial arteries. Psychiatric: She has a normal mood and affect. Her behavior is normal.       DIAGNOSIS:    Diagnosis Orders   1. Type 2 diabetes mellitus with diabetic polyneuropathy, without long-term current use of insulin (HCC)  Comprehensive Metabolic Panel    Hemoglobin A1C    Lipid Panel    Well controlled, continue current treatment. 2. Acquired hypothyroidism  TSH with Reflex    Well controlled, continue current treatment. 3. Gastroesophageal reflux disease without esophagitis      Well controlled, continue current treatment. 4.  Positive depression screening probably a result of her morning the recent loss of her  Fuentes Cortes.   She does not feel she needs any treatment for depression

## 2019-01-22 ENCOUNTER — OFFICE VISIT (OUTPATIENT)
Dept: FAMILY MEDICINE CLINIC | Age: 74
End: 2019-01-22
Payer: MEDICARE

## 2019-01-22 VITALS
DIASTOLIC BLOOD PRESSURE: 66 MMHG | OXYGEN SATURATION: 96 % | WEIGHT: 201.6 LBS | TEMPERATURE: 98.3 F | RESPIRATION RATE: 14 BRPM | HEART RATE: 72 BPM | SYSTOLIC BLOOD PRESSURE: 134 MMHG | HEIGHT: 67 IN | BODY MASS INDEX: 31.64 KG/M2

## 2019-01-22 DIAGNOSIS — E11.40 NEUROPATHY DUE TO TYPE 2 DIABETES MELLITUS (HCC): ICD-10-CM

## 2019-01-22 DIAGNOSIS — K21.9 GASTROESOPHAGEAL REFLUX DISEASE WITHOUT ESOPHAGITIS: Primary | ICD-10-CM

## 2019-01-22 DIAGNOSIS — R07.2 PRECORDIAL PAIN: ICD-10-CM

## 2019-01-22 PROCEDURE — G8510 SCR DEP NEG, NO PLAN REQD: HCPCS | Performed by: FAMILY MEDICINE

## 2019-01-22 PROCEDURE — 1090F PRES/ABSN URINE INCON ASSESS: CPT | Performed by: FAMILY MEDICINE

## 2019-01-22 PROCEDURE — 1101F PT FALLS ASSESS-DOCD LE1/YR: CPT | Performed by: FAMILY MEDICINE

## 2019-01-22 PROCEDURE — 3017F COLORECTAL CA SCREEN DOC REV: CPT | Performed by: FAMILY MEDICINE

## 2019-01-22 PROCEDURE — 99214 OFFICE O/P EST MOD 30 MIN: CPT | Performed by: FAMILY MEDICINE

## 2019-01-22 PROCEDURE — G8427 DOCREV CUR MEDS BY ELIG CLIN: HCPCS | Performed by: FAMILY MEDICINE

## 2019-01-22 PROCEDURE — 2022F DILAT RTA XM EVC RTNOPTHY: CPT | Performed by: FAMILY MEDICINE

## 2019-01-22 PROCEDURE — 3046F HEMOGLOBIN A1C LEVEL >9.0%: CPT | Performed by: FAMILY MEDICINE

## 2019-01-22 PROCEDURE — 93000 ELECTROCARDIOGRAM COMPLETE: CPT | Performed by: FAMILY MEDICINE

## 2019-01-22 PROCEDURE — 4040F PNEUMOC VAC/ADMIN/RCVD: CPT | Performed by: FAMILY MEDICINE

## 2019-01-22 PROCEDURE — 1036F TOBACCO NON-USER: CPT | Performed by: FAMILY MEDICINE

## 2019-01-22 PROCEDURE — G8482 FLU IMMUNIZE ORDER/ADMIN: HCPCS | Performed by: FAMILY MEDICINE

## 2019-01-22 PROCEDURE — G8417 CALC BMI ABV UP PARAM F/U: HCPCS | Performed by: FAMILY MEDICINE

## 2019-01-22 PROCEDURE — 1123F ACP DISCUSS/DSCN MKR DOCD: CPT | Performed by: FAMILY MEDICINE

## 2019-01-22 PROCEDURE — G8399 PT W/DXA RESULTS DOCUMENT: HCPCS | Performed by: FAMILY MEDICINE

## 2019-01-22 RX ORDER — DICLOFENAC SODIUM 75 MG/1
TABLET, DELAYED RELEASE ORAL
Refills: 2 | COMMUNITY
Start: 2019-01-18 | End: 2020-12-31

## 2019-01-22 RX ORDER — PANTOPRAZOLE SODIUM 40 MG/1
40 TABLET, DELAYED RELEASE ORAL DAILY
Qty: 90 TABLET | Refills: 1 | Status: SHIPPED | OUTPATIENT
Start: 2019-01-22 | End: 2020-12-31 | Stop reason: ALTCHOICE

## 2019-01-22 RX ORDER — PREGABALIN 150 MG/1
150 CAPSULE ORAL 2 TIMES DAILY
Qty: 180 CAPSULE | Refills: 1 | Status: SHIPPED | OUTPATIENT
Start: 2019-01-22 | End: 2019-01-23 | Stop reason: SDUPTHER

## 2019-01-22 ASSESSMENT — PATIENT HEALTH QUESTIONNAIRE - PHQ9
SUM OF ALL RESPONSES TO PHQ9 QUESTIONS 1 & 2: 0
2. FEELING DOWN, DEPRESSED OR HOPELESS: 0
SUM OF ALL RESPONSES TO PHQ QUESTIONS 1-9: 0
SUM OF ALL RESPONSES TO PHQ QUESTIONS 1-9: 0
1. LITTLE INTEREST OR PLEASURE IN DOING THINGS: 0

## 2019-01-23 ENCOUNTER — TELEPHONE (OUTPATIENT)
Dept: FAMILY MEDICINE CLINIC | Age: 74
End: 2019-01-23

## 2019-01-23 DIAGNOSIS — E11.40 NEUROPATHY DUE TO TYPE 2 DIABETES MELLITUS (HCC): ICD-10-CM

## 2019-01-23 RX ORDER — PREGABALIN 150 MG/1
150 CAPSULE ORAL 2 TIMES DAILY
Qty: 180 CAPSULE | Refills: 1 | Status: SHIPPED | OUTPATIENT
Start: 2019-01-23 | End: 2021-02-11

## 2019-01-30 LAB — GLUCOSE BLD-MCNC: 136 MG/DL (ref 70–100)

## 2019-01-31 LAB
GLUCOSE BLD-MCNC: 108 MG/DL (ref 70–100)
GLUCOSE BLD-MCNC: 116 MG/DL (ref 70–100)
GLUCOSE BLD-MCNC: 129 MG/DL (ref 70–100)
GLUCOSE BLD-MCNC: 134 MG/DL (ref 70–100)

## 2019-02-01 LAB
ANION GAP SERPL CALCULATED.3IONS-SCNC: 10 MMOL/L (ref 10–20)
BICARBONATE: 26 MMOL/L (ref 21–32)
BUN / CREAT RATIO: 15 (ref 5–25)
CALCIUM SERPL-MCNC: 8.8 MG/DL (ref 8.6–10.3)
CHLORIDE BLD-SCNC: 109 MMOL/L (ref 98–107)
CREAT SERPL-MCNC: 0.78 MG/DL (ref 0.5–1.05)
ERYTHROCYTE [DISTWIDTH] IN BLOOD BY AUTOMATED COUNT: 13.7 % (ref 12–15.4)
ERYTHROCYTE [DISTWIDTH] IN BLOOD BY AUTOMATED COUNT: 42.8 FL (ref 39.3–48.6)
GFR CALCULATED: >60
GLUCOSE BLD-MCNC: 118 MG/DL (ref 70–100)
GLUCOSE BLD-MCNC: 131 MG/DL (ref 70–100)
GLUCOSE BLD-MCNC: 96 MG/DL (ref 70–100)
GLUCOSE: 108 MG/DL (ref 70–100)
HCT VFR BLD CALC: 38.7 % (ref 36.5–46.6)
HEMOGLOBIN: 12.4 G/DL (ref 11.8–15.3)
MCH RBC QN AUTO: 27.2 PG (ref 27.5–33)
MCHC RBC AUTO-ENTMCNC: 32 G/DL (ref 30.1–35)
MCV RBC AUTO: 84.9 FL (ref 85.4–100)
NUCLEATED RBCS: 0 /100{WBCS}
PLATELET # BLD: 205 10*3/UL (ref 155–404)
PMV BLD AUTO: 10.8 FL (ref 9.9–12.1)
POTASSIUM SERPL-SCNC: 3.5 MMOL/L (ref 3.5–5.1)
RBC: 4.56 10*6/UL (ref 3.85–5.1)
RBCS COUNTED: 0 10*3/UL
SODIUM BLD-SCNC: 142 MMOL/L (ref 136–145)
UREA NITROGEN: 12 MG/DL (ref 6–23)
WBC: 6.4 10*3/UL (ref 4.4–9.9)

## 2019-02-02 ENCOUNTER — TELEPHONE (OUTPATIENT)
Dept: FAMILY MEDICINE CLINIC | Age: 74
End: 2019-02-02

## 2019-02-02 ENCOUNTER — OFFICE VISIT (OUTPATIENT)
Dept: FAMILY MEDICINE CLINIC | Age: 74
End: 2019-02-02
Payer: MEDICARE

## 2019-02-02 VITALS
HEIGHT: 67 IN | TEMPERATURE: 97.9 F | HEART RATE: 65 BPM | DIASTOLIC BLOOD PRESSURE: 82 MMHG | WEIGHT: 191 LBS | BODY MASS INDEX: 29.98 KG/M2 | RESPIRATION RATE: 16 BRPM | SYSTOLIC BLOOD PRESSURE: 132 MMHG

## 2019-02-02 DIAGNOSIS — R10.13 ABDOMINAL PAIN, EPIGASTRIC: Primary | ICD-10-CM

## 2019-02-02 DIAGNOSIS — K29.00 OTHER ACUTE GASTRITIS WITHOUT HEMORRHAGE: ICD-10-CM

## 2019-02-02 PROCEDURE — 99214 OFFICE O/P EST MOD 30 MIN: CPT | Performed by: FAMILY MEDICINE

## 2019-02-02 PROCEDURE — G8399 PT W/DXA RESULTS DOCUMENT: HCPCS | Performed by: FAMILY MEDICINE

## 2019-02-02 PROCEDURE — 1101F PT FALLS ASSESS-DOCD LE1/YR: CPT | Performed by: FAMILY MEDICINE

## 2019-02-02 PROCEDURE — 4040F PNEUMOC VAC/ADMIN/RCVD: CPT | Performed by: FAMILY MEDICINE

## 2019-02-02 PROCEDURE — G8427 DOCREV CUR MEDS BY ELIG CLIN: HCPCS | Performed by: FAMILY MEDICINE

## 2019-02-02 PROCEDURE — G8417 CALC BMI ABV UP PARAM F/U: HCPCS | Performed by: FAMILY MEDICINE

## 2019-02-02 PROCEDURE — 1090F PRES/ABSN URINE INCON ASSESS: CPT | Performed by: FAMILY MEDICINE

## 2019-02-02 PROCEDURE — G8482 FLU IMMUNIZE ORDER/ADMIN: HCPCS | Performed by: FAMILY MEDICINE

## 2019-02-02 PROCEDURE — 3017F COLORECTAL CA SCREEN DOC REV: CPT | Performed by: FAMILY MEDICINE

## 2019-02-02 PROCEDURE — 1123F ACP DISCUSS/DSCN MKR DOCD: CPT | Performed by: FAMILY MEDICINE

## 2019-02-02 PROCEDURE — 1036F TOBACCO NON-USER: CPT | Performed by: FAMILY MEDICINE

## 2019-02-02 RX ORDER — ONDANSETRON HYDROCHLORIDE 8 MG/1
8 TABLET, FILM COATED ORAL
Qty: 30 TABLET | Refills: 1 | Status: SHIPPED | OUTPATIENT
Start: 2019-02-02 | End: 2020-12-31 | Stop reason: ALTCHOICE

## 2019-02-02 RX ORDER — DICYCLOMINE HCL 20 MG
20 TABLET ORAL EVERY 6 HOURS
Qty: 40 TABLET | Refills: 2 | Status: SHIPPED | OUTPATIENT
Start: 2019-02-02 | End: 2020-12-31

## 2019-02-04 DIAGNOSIS — K29.00 OTHER ACUTE GASTRITIS WITHOUT HEMORRHAGE: ICD-10-CM

## 2019-02-04 DIAGNOSIS — R10.13 ABDOMINAL PAIN, EPIGASTRIC: ICD-10-CM

## 2019-02-05 LAB — GI BACTERIAL PATHOGENS BY PCR: NORMAL

## 2019-02-10 ASSESSMENT — ENCOUNTER SYMPTOMS
COUGH: 0
BLOOD IN STOOL: 0
SHORTNESS OF BREATH: 0
NAUSEA: 1
DIARRHEA: 0
CONSTIPATION: 0
RECTAL PAIN: 0
VOMITING: 1
ABDOMINAL PAIN: 1
ANAL BLEEDING: 0

## 2019-02-12 ENCOUNTER — OFFICE VISIT (OUTPATIENT)
Dept: CARDIOLOGY CLINIC | Age: 74
End: 2019-02-12
Payer: MEDICARE

## 2019-02-12 ENCOUNTER — OFFICE VISIT (OUTPATIENT)
Dept: GASTROENTEROLOGY | Age: 74
End: 2019-02-12
Payer: MEDICARE

## 2019-02-12 VITALS
SYSTOLIC BLOOD PRESSURE: 120 MMHG | HEART RATE: 62 BPM | DIASTOLIC BLOOD PRESSURE: 80 MMHG | WEIGHT: 196 LBS | RESPIRATION RATE: 20 BRPM | HEIGHT: 67 IN | BODY MASS INDEX: 30.76 KG/M2

## 2019-02-12 VITALS
OXYGEN SATURATION: 98 % | HEIGHT: 67 IN | BODY MASS INDEX: 30.79 KG/M2 | WEIGHT: 196.2 LBS | RESPIRATION RATE: 20 BRPM | HEART RATE: 62 BPM | SYSTOLIC BLOOD PRESSURE: 120 MMHG | DIASTOLIC BLOOD PRESSURE: 80 MMHG

## 2019-02-12 DIAGNOSIS — I34.0 NON-RHEUMATIC MITRAL REGURGITATION: ICD-10-CM

## 2019-02-12 DIAGNOSIS — I25.10 CORONARY ARTERY DISEASE INVOLVING NATIVE CORONARY ARTERY OF NATIVE HEART WITHOUT ANGINA PECTORIS: ICD-10-CM

## 2019-02-12 DIAGNOSIS — R10.13 DYSPEPSIA: Primary | ICD-10-CM

## 2019-02-12 DIAGNOSIS — R94.39 ABNORMAL STRESS TEST: Primary | ICD-10-CM

## 2019-02-12 PROCEDURE — 1090F PRES/ABSN URINE INCON ASSESS: CPT | Performed by: SPECIALIST

## 2019-02-12 PROCEDURE — 4040F PNEUMOC VAC/ADMIN/RCVD: CPT | Performed by: SPECIALIST

## 2019-02-12 PROCEDURE — G8427 DOCREV CUR MEDS BY ELIG CLIN: HCPCS | Performed by: INTERNAL MEDICINE

## 2019-02-12 PROCEDURE — 99213 OFFICE O/P EST LOW 20 MIN: CPT | Performed by: INTERNAL MEDICINE

## 2019-02-12 PROCEDURE — G8427 DOCREV CUR MEDS BY ELIG CLIN: HCPCS | Performed by: SPECIALIST

## 2019-02-12 PROCEDURE — 1036F TOBACCO NON-USER: CPT | Performed by: INTERNAL MEDICINE

## 2019-02-12 PROCEDURE — G8599 NO ASA/ANTIPLAT THER USE RNG: HCPCS | Performed by: INTERNAL MEDICINE

## 2019-02-12 PROCEDURE — 3017F COLORECTAL CA SCREEN DOC REV: CPT | Performed by: SPECIALIST

## 2019-02-12 PROCEDURE — G8399 PT W/DXA RESULTS DOCUMENT: HCPCS | Performed by: INTERNAL MEDICINE

## 2019-02-12 PROCEDURE — 1123F ACP DISCUSS/DSCN MKR DOCD: CPT | Performed by: SPECIALIST

## 2019-02-12 PROCEDURE — G8417 CALC BMI ABV UP PARAM F/U: HCPCS | Performed by: INTERNAL MEDICINE

## 2019-02-12 PROCEDURE — 1036F TOBACCO NON-USER: CPT | Performed by: SPECIALIST

## 2019-02-12 PROCEDURE — 1101F PT FALLS ASSESS-DOCD LE1/YR: CPT | Performed by: INTERNAL MEDICINE

## 2019-02-12 PROCEDURE — 4040F PNEUMOC VAC/ADMIN/RCVD: CPT | Performed by: INTERNAL MEDICINE

## 2019-02-12 PROCEDURE — 1090F PRES/ABSN URINE INCON ASSESS: CPT | Performed by: INTERNAL MEDICINE

## 2019-02-12 PROCEDURE — 1123F ACP DISCUSS/DSCN MKR DOCD: CPT | Performed by: INTERNAL MEDICINE

## 2019-02-12 PROCEDURE — 99213 OFFICE O/P EST LOW 20 MIN: CPT | Performed by: SPECIALIST

## 2019-02-12 PROCEDURE — G8417 CALC BMI ABV UP PARAM F/U: HCPCS | Performed by: SPECIALIST

## 2019-02-12 PROCEDURE — G8482 FLU IMMUNIZE ORDER/ADMIN: HCPCS | Performed by: INTERNAL MEDICINE

## 2019-02-12 PROCEDURE — G8599 NO ASA/ANTIPLAT THER USE RNG: HCPCS | Performed by: SPECIALIST

## 2019-02-12 PROCEDURE — 3017F COLORECTAL CA SCREEN DOC REV: CPT | Performed by: INTERNAL MEDICINE

## 2019-02-12 PROCEDURE — G8482 FLU IMMUNIZE ORDER/ADMIN: HCPCS | Performed by: SPECIALIST

## 2019-02-12 PROCEDURE — G8399 PT W/DXA RESULTS DOCUMENT: HCPCS | Performed by: SPECIALIST

## 2019-02-12 PROCEDURE — 1101F PT FALLS ASSESS-DOCD LE1/YR: CPT | Performed by: SPECIALIST

## 2019-02-12 RX ORDER — METOPROLOL SUCCINATE 25 MG/1
25 TABLET, EXTENDED RELEASE ORAL DAILY
Qty: 90 TABLET | Refills: 3 | Status: SHIPPED | OUTPATIENT
Start: 2019-02-12 | End: 2020-12-31

## 2019-02-12 ASSESSMENT — ENCOUNTER SYMPTOMS
RESPIRATORY NEGATIVE: 1
ALLERGIC/IMMUNOLOGIC NEGATIVE: 1
EYES NEGATIVE: 1
GASTROINTESTINAL NEGATIVE: 1

## 2019-02-25 ENCOUNTER — TELEPHONE (OUTPATIENT)
Dept: FAMILY MEDICINE CLINIC | Age: 74
End: 2019-02-25

## 2020-12-14 ENCOUNTER — HOSPITAL ENCOUNTER (OUTPATIENT)
Dept: RADIATION ONCOLOGY | Age: 75
End: 2020-12-14
Payer: MEDICARE

## 2020-12-22 ENCOUNTER — NURSE ONLY (OUTPATIENT)
Dept: PRIMARY CARE CLINIC | Age: 75
End: 2020-12-22

## 2020-12-22 DIAGNOSIS — Z17.0 MALIGNANT NEOPLASM OF UPPER-OUTER QUADRANT OF LEFT BREAST IN FEMALE, ESTROGEN RECEPTOR POSITIVE (HCC): ICD-10-CM

## 2020-12-22 DIAGNOSIS — C50.412 MALIGNANT NEOPLASM OF UPPER-OUTER QUADRANT OF LEFT BREAST IN FEMALE, ESTROGEN RECEPTOR POSITIVE (HCC): ICD-10-CM

## 2020-12-23 LAB
SARS-COV-2: NOT DETECTED
SOURCE: NORMAL

## 2020-12-31 ENCOUNTER — HOSPITAL ENCOUNTER (OUTPATIENT)
Dept: RADIATION ONCOLOGY | Age: 75
Discharge: HOME | End: 2020-12-31
Payer: MEDICARE

## 2020-12-31 PROCEDURE — 77290 THER RAD SIMULAJ FIELD CPLX: CPT | Performed by: RADIOLOGY

## 2020-12-31 PROCEDURE — 77334 RADIATION TREATMENT AID(S): CPT | Performed by: RADIOLOGY

## 2020-12-31 PROCEDURE — 99212 OFFICE O/P EST SF 10 MIN: CPT | Performed by: RADIOLOGY

## 2020-12-31 NOTE — H&P
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 HPI: I was asked by Nata Hooper to see this 76 y.o. female who had an abnormal L screening mammogram 7/20/20. Diagnostic imaging including US showed a spiculated 7.2 mm hypoechoic mass in the L breast at 3 oclock. There were multiple enlarged nodes thought to be present on exam but determined to be normal on imaging. Core bx 8/19/20 showed a G3 IDC with lymphohistiocytic infiltration ERPR HER2+. She went to the OR with Dr Liss Hanson on 9/29/20 for lumpectomy and SLN evaluation with closure by Dr Merle Muller. Final pathology demonstrated a 1.2 cm G3 IDC with int-hiogh grade DCIS, single focus with LVI present no EIC and negative margins and 0/4 SLN. Her case was discussed in [facility] Breast tumor board and taxol herceptin, AI and anduvantradiation were recommended. She saw Dr Bianca Ewing postoperatively and chemotherapy and antiHer2 therapy was recommended in addition to XRT and AI. Jamison Nieves Estimated 5060% cure with surgery alone and over 90% cure with surgery, chemotherapy, radiation, and AI. She Started chemotherapy 11/11/2020 and now the patient declines more chemotherapy treatment because of severe side effects. Severe diarrhea, nausea, anorexia, and weakness after a couple weeks of Taxol and Herceptin. She developed swelling in right arm with PICC. Ultrasound was positive for DVT early 12/2020. She started Xarelto and swelling slowly getting better. She feels better since she has been off chemotherapy treatment. She initially declined the radiation appointment but after speaking with Dr Den Lowry office she is here for consultation. She has a strong family history of breast and ovarian cancer: sister with breast cancer, maternal cousins with breast cancer, 3 paternal aunts with breast cancer, and father with colon, prostate and mesothelioma. Genetics has been recommended and she has an appointment coming up.      PAST MEDICAL HISTORY:   Past Medical History:   Diagnosis Date    Allergic rhinitis, seasonal     Arthritis     NECK  Bursitis, subacromial/subdeltoid     C6 radiculopathy     Coronary artery disease involving native coronary artery of native heart without angina pectoris 2/12/2019    Degenerative joint disease of wrist, right     GERD (gastroesophageal reflux disease)     Glaucoma     H pylori ulcer     Hiatal hernia     Hypertension     Hypothyroidism     Impaired fasting blood sugar     Leg cramps     Non-rheumatic mitral regurgitation 2/12/2019    Vitamin B 12 deficiency        PAST SURGICAL HISTORY:  Past Surgical History:   Procedure Laterality Date    APPENDECTOMY      BREAST SURGERY      LUMP REMOVED    HYSTERECTOMY          ALLERGIES:   Allergies   Allergen Reactions    Prochlorperazine Anxiety    Actos [Pioglitazone Hydrochloride]     Codeine     Prochlorperazine Edisylate      Hyperactivity    Sulfa Antibiotics Nausea Only     Upset stomach       CURRENT MEDICATIONS:   Prior to Admission medications    Medication Sig Start Date End Date Taking? Authorizing Provider   metoprolol succinate (TOPROL XL) 25 MG extended release tablet Take 1 tablet by mouth daily 2/12/19   [facility] Holiday, DO   ondansetron (ZOFRAN) 8 MG tablet Take 1 tablet by mouth 3 times daily (before meals) For nausea 2/2/19   [facility], DO   dicyclomine (BENTYL) 20 MG tablet Take 1 tablet by mouth every 6 hours If needed for pain 2/2/19   [facility], DO   pregabalin (LYRICA) 150 MG capsule Take 1 capsule by mouth 2 times daily for 180 days. . 1/23/19 7/22/19  Kevin Abraham MD   diclofenac (VOLTAREN) 75 MG EC tablet TAKE ONE TABLET BY MOUTH TWO TIMES A DAY 1/18/19   Historical Provider, MD   pantoprazole (PROTONIX) 40 MG tablet Take 1 tablet by mouth daily 1/22/19   Kevin Abraham MD   levothyroxine (SYNTHROID) 88 MCG tablet TAKE 1 TABLET DAILY 10/16/18   Kevin Abraham MD   baclofen (LIORESAL) 10 MG tablet Take 0.5 tablets by mouth 3 times daily as needed (MUSCLE SPASM) 6/23/18   PHUONG Chen - CNP metFORMIN (GLUCOPHAGE) 500 MG tablet Take 1 tablet by mouth 2 times daily (with meals) 3/10/18   Jacky Nunes MD   meclizine (ANTIVERT) 25 MG tablet TAKE ONE TABLET BY MOUTH THREE TIMES A DAY AS NEEDED 1/3/18   Historical Provider, MD   Probiotic Product (PROBIOTIC DAILY PO) Take by mouth    Historical Provider, MD   cyanocobalamin 1000 MCG tablet Take 1,000 mcg by mouth daily. Historical Provider, MD   VITAMIN D-3 (COLECALCIFEROL) 400 UNITS TABS Take  by mouth daily. Historical Provider, MD       I have personally reviewed and reconciled the medications listed. FAMILY HISTORY:   Family History   Problem Relation Age of Onset    Arthritis Mother     Cancer Father     Cancer Sister     Breast Cancer Sister        SOCIAL HISTORY:   Social History     Tobacco Use   Smoking Status Never Smoker   Smokeless Tobacco Never Used     Social History     Substance and Sexual Activity   Alcohol Use No    Alcohol/week: 0.0 standard drinks       Review Of Systems:     Pain Score:   Pain Score (1-10): 0      Is pain affecting your ability to take care of yourself or move throughout your home? []? Yes   [x]? No    General: Fatigue  lonely  Patient has gained weight [x]? Yes   []? No  Patient has lost weight []? Yes   []? No  How much weight in pounds and over what length of time:      Eyes (Ophthalmic): No Problem                Skin (Dermatological): Lesions nasal folds and lip                ENT: No Problems                Respiratory: No Problems                Cardiovascular: No Problems                            Device   []? Yes   []? No              Copy of Card Obtained []? Yes   []?  No     Gastrointestinal: No Problems     Genito-Urinary:                 Breast: Changes lt cancer                Musculoskeletal: Joint Pain rt knee from knee surgery     Neurological: Dizziness                            Hematological and Lymphatic: No Problems Endocrine: Diabetes Mellitus and Thyroid Problems     Gyn History:   /Para: 4/4  Age of Menarche: 15  Age of Menopause total hysterectomy late 40's  Vaginal Bleeding:  no  First Pregnancy: 18  Breast Feeding:   no  Last Menstrual period:   Oral Contraceptives: yes     Number of years: 4  Hormone Replacement therapy yes     Number of Years:  Not long  Last Pap Smear:  Long ago  Last Mammogram  2020    A 10-point review of systems has been conducted and pertinent positives have been   recorded. All other review of systems are negative. ECOG PERFORMANCE STATUS: 1    VITAL SIGNS:   144/67 T97.2 P 71 R 14 192 lbs 5'7\" BMI 30.3    PHYSICAL EXAMINATION:  Constitutional: No acute distress. awake, alert, cooperative    HEENT:  Normocephalic, without obvious abnormality, atraumatic, EOMI, external ears without lesions, oral pharynx with moist mucus membranes, orophayrnx clear, mucous membranes moist    NECK:  Supple, without supraclavicular or cervical adenopathy, thyroid symmetric, not enlarged    CARDIOVASCULAR:  Normal apical impulse, regular rate and rhythm, normal S1 and S2, no S3 or S4, and no murmur noted    CHEST/BREASTS:  Breasts asymmetrical with surgical deficit L breast, healed with scar present, otherwise skin without lesion(s), no nipple retraction or dimpling, no nipple discharge, no masses palpated, no axillary or supraclavicular adenopathy    LUNGS:  No increased work of breathing, good air exchange, clear to auscultation bilaterally, no crackles or wheezing    ABDOMEN:  Nontender, nondistended, normal bowel sounds, soft, no masses, no hepatosplenomegally    EXTREMITIES: No cyanosis clubbing or lypmhedema    MUSCULOSKELETAL: No bony tenderness along the hips ribs or spine. Motor strength is 5 out of 5 all extremities bilaterally. Tone is normal.    NEUROLOGIC:  Awake, alert, oriented x 3.  Nonfocal SKIN:  no bruising or bleeding, normal skin color, texture, turgor, no redness, warmth, or swelling, no rashes, no lesions, no abnormal moles and no jaundice      STUDIES: I have personally reviewed the imaging, laboratory, and pathology studies as previously described. 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 Nature of biology of tumor and importance of local control emphasized during this visit, also strong consideration of endocrine therapy to follow recommended radiation. 42.56GY L breast followed by 10 Gy boost to tumor bed for high grade and LVI. 20 fx total. Simulation today to start early in the new year. She is scheduled to see Dr Treasure Cordero back on 1/13/21 and genetics in a couple of weeks    Intent of treatment, potential risks benefits and side effects reviewed today. Thank you for this consult and allowing us to participate in the care of this patient.   Sincerely,    Electronically signed by Gustavo Grimes MD on 12/31/20 at 10:21 AM EST      cc:   No att. providers found  Genesis Crawford MD  [facility], [address] [facility] [address]  [facility]

## 2021-01-04 PROCEDURE — 77300 RADIATION THERAPY DOSE PLAN: CPT | Performed by: RADIOLOGY

## 2021-01-04 PROCEDURE — 77295 3-D RADIOTHERAPY PLAN: CPT | Performed by: RADIOLOGY

## 2021-01-04 PROCEDURE — 77334 RADIATION TREATMENT AID(S): CPT | Performed by: RADIOLOGY

## 2021-01-06 ENCOUNTER — HOSPITAL ENCOUNTER (OUTPATIENT)
Dept: RADIATION ONCOLOGY | Age: 76
Discharge: HOME | End: 2021-01-06
Attending: RADIOLOGY
Payer: MEDICARE

## 2021-01-06 PROCEDURE — 99214 OFFICE O/P EST MOD 30 MIN: CPT | Performed by: RADIOLOGY

## 2021-01-06 PROCEDURE — 77280 THER RAD SIMULAJ FIELD SMPL: CPT | Performed by: RADIOLOGY

## 2021-01-06 NOTE — PROGRESS NOTES
Teaching & Instructions - Breast  General  Simulation  Initial Treatment  Self-Care Info  Nutrition  Social Service    Site-Specific  Side Effects  Fatigue Mgmt  Lymphedema Mgmt  Pain Mgmt  Skin Care    Other/Prevention  Smoking Cessation    Educational Handouts  Radiation Therapy & You  Site Specific Instructions    Lotion given  Patient eager to learn, verbalized understanding of verbal education and handouts.

## 2021-01-07 ENCOUNTER — HOSPITAL ENCOUNTER (OUTPATIENT)
Dept: RADIATION ONCOLOGY | Age: 76
Discharge: HOME | End: 2021-01-07
Attending: RADIOLOGY
Payer: MEDICARE

## 2021-01-07 PROCEDURE — 77412 RADIATION TX DELIVERY LVL 3: CPT | Performed by: RADIOLOGY

## 2021-01-07 PROCEDURE — 77387 GUIDANCE FOR RADJ TX DLVR: CPT | Performed by: RADIOLOGY

## 2021-01-08 ENCOUNTER — HOSPITAL ENCOUNTER (OUTPATIENT)
Dept: RADIATION ONCOLOGY | Age: 76
Discharge: HOME | End: 2021-01-08
Attending: RADIOLOGY
Payer: MEDICARE

## 2021-01-08 PROCEDURE — 77412 RADIATION TX DELIVERY LVL 3: CPT | Performed by: RADIOLOGY

## 2021-01-11 ENCOUNTER — HOSPITAL ENCOUNTER (OUTPATIENT)
Dept: RADIATION ONCOLOGY | Age: 76
Discharge: HOME | End: 2021-01-11
Attending: RADIOLOGY
Payer: MEDICARE

## 2021-01-11 PROCEDURE — 77412 RADIATION TX DELIVERY LVL 3: CPT | Performed by: RADIOLOGY

## 2021-01-12 ENCOUNTER — HOSPITAL ENCOUNTER (OUTPATIENT)
Dept: RADIATION ONCOLOGY | Age: 76
Discharge: HOME | End: 2021-01-12
Attending: RADIOLOGY
Payer: MEDICARE

## 2021-01-12 PROCEDURE — 99211 OFF/OP EST MAY X REQ PHY/QHP: CPT | Performed by: RADIOLOGY

## 2021-01-12 PROCEDURE — 77412 RADIATION TX DELIVERY LVL 3: CPT | Performed by: RADIOLOGY

## 2021-01-12 RX ORDER — ANASTROZOLE 1 MG/1
1 TABLET ORAL DAILY
COMMUNITY
Start: 2021-01-01

## 2021-01-12 RX ORDER — RIVAROXABAN 20 MG/1
20 TABLET, FILM COATED ORAL DAILY
COMMUNITY
Start: 2021-01-01

## 2021-01-13 ENCOUNTER — HOSPITAL ENCOUNTER (OUTPATIENT)
Dept: RADIATION ONCOLOGY | Age: 76
Discharge: HOME | End: 2021-01-13
Attending: RADIOLOGY
Payer: MEDICARE

## 2021-01-13 PROCEDURE — 77412 RADIATION TX DELIVERY LVL 3: CPT | Performed by: RADIOLOGY

## 2021-01-13 PROCEDURE — 77417 THER RADIOLOGY PORT IMAGE(S): CPT | Performed by: RADIOLOGY

## 2021-01-13 PROCEDURE — 77336 RADIATION PHYSICS CONSULT: CPT | Performed by: RADIOLOGY

## 2021-01-14 ENCOUNTER — HOSPITAL ENCOUNTER (OUTPATIENT)
Dept: RADIATION ONCOLOGY | Age: 76
Discharge: HOME | End: 2021-01-14
Attending: RADIOLOGY
Payer: MEDICARE

## 2021-01-14 PROCEDURE — 77412 RADIATION TX DELIVERY LVL 3: CPT | Performed by: RADIOLOGY

## 2021-01-15 ENCOUNTER — HOSPITAL ENCOUNTER (OUTPATIENT)
Dept: RADIATION ONCOLOGY | Age: 76
Discharge: HOME | End: 2021-01-15
Attending: RADIOLOGY
Payer: MEDICARE

## 2021-01-15 PROCEDURE — 77412 RADIATION TX DELIVERY LVL 3: CPT | Performed by: RADIOLOGY

## 2021-01-18 ENCOUNTER — HOSPITAL ENCOUNTER (OUTPATIENT)
Dept: RADIATION ONCOLOGY | Age: 76
Discharge: HOME | End: 2021-01-18
Attending: RADIOLOGY
Payer: MEDICARE

## 2021-01-18 PROCEDURE — 77412 RADIATION TX DELIVERY LVL 3: CPT | Performed by: RADIOLOGY

## 2021-01-18 PROCEDURE — 77334 RADIATION TREATMENT AID(S): CPT | Performed by: RADIOLOGY

## 2021-01-18 PROCEDURE — 77321 SPECIAL TELETX PORT PLAN: CPT | Performed by: RADIOLOGY

## 2021-01-18 NOTE — PROGRESS NOTES
Aissatou Ruizrobertaharoldo   [id number]  1945   Patient Mid-Point Distress Screening Form   Please select the number that describes how much distress you have experiened in the past week (0-10): 2    Please select the number that descrbes how much distress you have experienced today (0-10): 2    If you responded with a score of 6 or above to the first tow questions; please address the following concerns:    Practical Concerns 0-10 Comment        Work, Concerns about Job          Finances, Bills, Insurance          Housing          Transportation          Availability of Caregiver     Family Concerns          Dealing with Children          Dealing with Partner          Ability to have [address] Issues     Emotional Concerns          Sadness, Depression          Anxiety, Worry, Fear          Concerns about Appearance          Loss of Interest in Usual Activities     Spiritual Concerns          Connection to a higher power          Sense of meaning and purpose          Support for my spiritual community     Physical Concerns          Nausea          Eating, Loss of Appetite          Pain          Fatigue       Other Concerns/Notes:Pt states minimal distress; disruption in daily activities due to RT tx. States lives alone; . Daughter and dtr's family is local and very supportive. States strong alvaro attributes to her positive coping with challenging life events (death of spouse, son and cancer dx.). She states no current needs, issues or concerns.     Date of visit: 1/18/2021 11:20 AM      : KILLIAN Pathak

## 2021-01-19 ENCOUNTER — HOSPITAL ENCOUNTER (OUTPATIENT)
Dept: RADIATION ONCOLOGY | Age: 76
Discharge: HOME | End: 2021-01-19
Attending: RADIOLOGY
Payer: MEDICARE

## 2021-01-19 PROCEDURE — 99211 OFF/OP EST MAY X REQ PHY/QHP: CPT | Performed by: RADIOLOGY

## 2021-01-19 PROCEDURE — 77412 RADIATION TX DELIVERY LVL 3: CPT | Performed by: RADIOLOGY

## 2021-01-20 ENCOUNTER — HOSPITAL ENCOUNTER (OUTPATIENT)
Dept: RADIATION ONCOLOGY | Age: 76
Discharge: HOME | End: 2021-01-20
Attending: RADIOLOGY
Payer: MEDICARE

## 2021-01-20 PROCEDURE — 77336 RADIATION PHYSICS CONSULT: CPT | Performed by: RADIOLOGY

## 2021-01-20 PROCEDURE — 77412 RADIATION TX DELIVERY LVL 3: CPT | Performed by: RADIOLOGY

## 2021-01-20 PROCEDURE — 77417 THER RADIOLOGY PORT IMAGE(S): CPT | Performed by: RADIOLOGY

## 2021-01-21 ENCOUNTER — HOSPITAL ENCOUNTER (OUTPATIENT)
Dept: RADIATION ONCOLOGY | Age: 76
Discharge: HOME | End: 2021-01-21
Attending: RADIOLOGY
Payer: MEDICARE

## 2021-01-21 PROCEDURE — 77412 RADIATION TX DELIVERY LVL 3: CPT | Performed by: RADIOLOGY

## 2021-01-22 ENCOUNTER — HOSPITAL ENCOUNTER (OUTPATIENT)
Dept: RADIATION ONCOLOGY | Age: 76
Discharge: HOME | End: 2021-01-22
Attending: RADIOLOGY
Payer: MEDICARE

## 2021-01-22 PROCEDURE — 77412 RADIATION TX DELIVERY LVL 3: CPT | Performed by: RADIOLOGY

## 2021-01-25 ENCOUNTER — HOSPITAL ENCOUNTER (OUTPATIENT)
Dept: RADIATION ONCOLOGY | Age: 76
Discharge: HOME | End: 2021-01-25
Attending: RADIOLOGY
Payer: MEDICARE

## 2021-01-25 PROCEDURE — 77412 RADIATION TX DELIVERY LVL 3: CPT | Performed by: RADIOLOGY

## 2021-01-26 ENCOUNTER — HOSPITAL ENCOUNTER (OUTPATIENT)
Dept: RADIATION ONCOLOGY | Age: 76
Discharge: HOME | End: 2021-01-26
Attending: RADIOLOGY
Payer: MEDICARE

## 2021-01-26 PROCEDURE — 99212 OFFICE O/P EST SF 10 MIN: CPT | Performed by: RADIOLOGY

## 2021-01-26 PROCEDURE — 77412 RADIATION TX DELIVERY LVL 3: CPT | Performed by: RADIOLOGY

## 2021-01-27 ENCOUNTER — HOSPITAL ENCOUNTER (OUTPATIENT)
Dept: RADIATION ONCOLOGY | Age: 76
Discharge: HOME | End: 2021-01-27
Attending: RADIOLOGY
Payer: MEDICARE

## 2021-01-27 PROCEDURE — 77412 RADIATION TX DELIVERY LVL 3: CPT | Performed by: RADIOLOGY

## 2021-01-27 PROCEDURE — 77336 RADIATION PHYSICS CONSULT: CPT | Performed by: RADIOLOGY

## 2021-01-28 ENCOUNTER — HOSPITAL ENCOUNTER (OUTPATIENT)
Dept: RADIATION ONCOLOGY | Age: 76
Discharge: HOME | End: 2021-01-28
Attending: RADIOLOGY
Payer: MEDICARE

## 2021-01-28 PROCEDURE — 77412 RADIATION TX DELIVERY LVL 3: CPT | Performed by: RADIOLOGY

## 2021-01-28 PROCEDURE — 77280 THER RAD SIMULAJ FIELD SMPL: CPT | Performed by: RADIOLOGY

## 2021-01-29 ENCOUNTER — HOSPITAL ENCOUNTER (OUTPATIENT)
Dept: RADIATION ONCOLOGY | Age: 76
Discharge: HOME | End: 2021-01-29
Attending: RADIOLOGY
Payer: MEDICARE

## 2021-01-29 PROCEDURE — 77412 RADIATION TX DELIVERY LVL 3: CPT | Performed by: RADIOLOGY

## 2021-02-01 ENCOUNTER — HOSPITAL ENCOUNTER (OUTPATIENT)
Dept: RADIATION ONCOLOGY | Age: 76
Discharge: HOME | End: 2021-02-01
Attending: RADIOLOGY
Payer: MEDICARE

## 2021-02-01 PROCEDURE — 77412 RADIATION TX DELIVERY LVL 3: CPT | Performed by: RADIOLOGY

## 2021-02-01 NOTE — ONCOLOGY
Radiation Oncology Completion Letter    Patient Name:   Kerwin Blackmon  Medical Record #: [id number]  Date of Birth: [birth year]  Diagnosis: Stage 1A L breast [id number] 1.2 cm G3 IDC + G2-3 DCIS + LVI no EIC 0/4 SLN ER 95% TN 40% Her2+ declines further chemotherapy  Treatment Dates:  1/7/21-2/3/21    Site: Dose: Total # fractions: Dose per fraction: Energy: Technique   L breast 42.56 Gy 16 2.66 Gy 6/10/18 MV photons tangents   Tumor bed 10 Gy 4 2.5 Gy 15 MeV electrons En face   Total 52.56 Gy 20        Concurrent therapy:  declines further chemotherapy/+ anastrazole      Treatment course:  Ms. Fransico Thomas tolerated radiation treatment well with the expected toxicity. Patient was instructed to return to our clinic in approximately 1-2 weeks post radiation treatment for skin check. She will continue to follow with Dr Dung Lin on anastrazole and with Dr Mynor Almeida who will order mammograms. Franco Bhat

## 2021-02-02 ENCOUNTER — HOSPITAL ENCOUNTER (OUTPATIENT)
Dept: RADIATION ONCOLOGY | Age: 76
Discharge: HOME | End: 2021-02-02
Attending: RADIOLOGY
Payer: MEDICARE

## 2021-02-02 PROCEDURE — 77412 RADIATION TX DELIVERY LVL 3: CPT | Performed by: RADIOLOGY

## 2021-02-03 ENCOUNTER — HOSPITAL ENCOUNTER (OUTPATIENT)
Dept: RADIATION ONCOLOGY | Age: 76
Discharge: HOME | End: 2021-02-03
Attending: RADIOLOGY
Payer: MEDICARE

## 2021-02-03 PROCEDURE — 99212 OFFICE O/P EST SF 10 MIN: CPT | Performed by: RADIOLOGY

## 2021-02-03 PROCEDURE — 77412 RADIATION TX DELIVERY LVL 3: CPT | Performed by: RADIOLOGY

## 2021-02-03 PROCEDURE — 77336 RADIATION PHYSICS CONSULT: CPT | Performed by: RADIOLOGY

## 2021-02-11 ENCOUNTER — HOSPITAL ENCOUNTER (OUTPATIENT)
Dept: RADIATION ONCOLOGY | Age: 76
Discharge: HOME | End: 2021-02-11
Attending: RADIOLOGY
Payer: MEDICARE

## 2021-02-11 VITALS
HEART RATE: 70 BPM | SYSTOLIC BLOOD PRESSURE: 150 MMHG | TEMPERATURE: 97.5 F | RESPIRATION RATE: 18 BRPM | BODY MASS INDEX: 30.23 KG/M2 | WEIGHT: 193 LBS | DIASTOLIC BLOOD PRESSURE: 76 MMHG | OXYGEN SATURATION: 99 %

## 2021-02-11 PROCEDURE — 99214 OFFICE O/P EST MOD 30 MIN: CPT | Performed by: RADIOLOGY

## 2021-02-11 RX ORDER — FUROSEMIDE 20 MG/1
20 TABLET ORAL DAILY
COMMUNITY

## 2021-02-11 NOTE — H&P
RADIATION FOLLOW UP NOTE  DATE OF VISIT: 2/11/2021    DIAGNOSIS & STAGING: *Stage 1A L breast [id number] 1.2 cm G3 IDC + G2-3 DCIS + LVI no EIC 0/4 SLN ER 95% MI 40% Her2+ declines further chemotherapy    PREVIOUS TREATMENT: Treatment Dates:                    1/7/21-2/3/21     Site: Dose: Total # fractions: Dose per fraction: Energy: Technique   L breast 42.56 Gy 16 2.66 Gy 6/10/18 MV photons tangents   Tumor bed 10 Gy 4 2.5 Gy 15 MeV electrons En face   Total 52.56 Gy 20            Concurrent therapy:             declines further chemotherapy/+ anastrazole    CURRENT COMPLAINT: Here today for routine follow up and skin care    INTERVAL HISTORY: Doing well, no pain, skin changes resolving    PAST MEDICAL HISTORY:   Past Medical History:   Diagnosis Date    Allergic rhinitis, seasonal     Arthritis     NECK    Breast cancer, left breast ([facility] [address] 09/2020    Bursitis, subacromial/subdeltoid     C6 radiculopathy     Coronary artery disease involving native coronary artery of native heart without angina pectoris 2/12/2019    Degenerative joint disease of wrist, right     GERD (gastroesophageal reflux disease)     Glaucoma     H pylori ulcer     Hiatal hernia     Hypertension     Hypothyroidism     Impaired fasting blood sugar     Leg cramps     Non-rheumatic mitral regurgitation 2/12/2019    Vitamin B 12 deficiency        PAST SURGICAL HISTORY:  Past Surgical History:   Procedure Laterality Date    APPENDECTOMY      BREAST SURGERY      LUMP REMOVED    CHOLECYSTECTOMY      CYST REMOVAL Right     EYE SURGERY      HYSTERECTOMY      JOINT REPLACEMENT Right     knee       ALLERGIES:   Allergies   Allergen Reactions    Prochlorperazine Anxiety    Actos [Pioglitazone Hydrochloride]     Codeine     Prochlorperazine Edisylate      Hyperactivity    Sulfa Antibiotics Nausea Only     Upset stomach        I have personally reviewed and reconciled the allergies listed.     CURRENT MEDICATIONS: Prior to Admission medications    Medication Sig Start Date End Date Taking? Authorizing Provider   furosemide (LASIX) 20 MG tablet Take 20 mg by mouth daily   Yes Historical Provider, MD   anastrozole (ARIMIDEX) 1 MG tablet Take 1 tablet by mouth daily 1/1/21  Yes Historical Provider, MD   rivaroxaban (XARELTO) 20 MG TABS tablet Take 20 mg by mouth daily  1/1/21  Yes Historical Provider, MD   pregabalin (LYRICA) 150 MG capsule Take 1 capsule by mouth 2 times daily for 180 days. . 1/23/19 2/11/21 Yes Lowell Harris MD   levothyroxine (SYNTHROID) 88 MCG tablet TAKE 1 TABLET DAILY 10/16/18  Yes Lowell Harris MD   metFORMIN (GLUCOPHAGE) 500 MG tablet Take 1 tablet by mouth 2 times daily (with meals) 3/10/18  Yes Lowell Harris MD   Probiotic Product (PROBIOTIC DAILY PO) Take by mouth   Yes Historical Provider, MD   cyanocobalamin 1000 MCG tablet Take 1,000 mcg by mouth daily. Yes Historical Provider, MD   VITAMIN D-3 (COLECALCIFEROL) 400 UNITS TABS Take  by mouth daily. Yes Historical Provider, MD       I have personally reviewed and reconciled the medications listed. FAMILY HISTORY:   Family History   Problem Relation Age of Onset    Arthritis Mother     Cancer Father     Cancer Sister     Breast Cancer Sister     Cancer Maternal Aunt         female    Coronary Art Dis Son        SOCIAL HISTORY:   Social History     Tobacco Use   Smoking Status Never Smoker   Smokeless Tobacco Never Used     Social History     Substance and Sexual Activity   Alcohol Use No    Alcohol/week: 0.0 standard drinks       Review Of Systems:    Pain Score:   Pain Score (1-10): 0 at this time  Pain Location: Left breast  Pain Duration:  Occasional Twinge   Pain Management/Control: Tylenol      Is pain affecting your ability to take care of yourself or move throughout your home? []? Yes   [x]? No    General: No Problems  Patient has gained weight [x]? Yes   []? No  Patient has lost weight []? Yes   [x]?  No How much weight in pounds and over what length of time: Up 1.8 lbs since last visit 2/3/21     Eyes (Ophthalmic): Floaters on occasion                Skin (Dermatological): Left breast area of brownish pink sink. ENT: No Problems                Respiratory: No Problems                Cardiovascular: No Problems                            Device   []? Yes   [x]? No              Copy of Card Obtained []? Yes   [x]? No     Gastrointestinal: No Problems     Genito-Urinary              No Problems                   Breast: S/P partial Left mastectomy                Musculoskeletal: No Problems     Neurological: No Problems                            Hematological and Lymphatic: No Problems                Endocrine: Diabetes Mellitus and Thyroid Problems       A 10-point review of systems has been conducted and pertinent positives have been   recorded. All other review of systems are negative. ECOG PERFORMANCE STATUS: 1    VITAL SIGNS:   Vitals:    02/11/21 1026   BP: (!) 150/76   Pulse: 70   Resp: 18   Temp: 97.5 °F (36.4 °C)   SpO2: 99%   Weight: 193 lb (87.5 kg)       PHYSICAL EXAMINATION:  Constitutional: No acute distress. awake, alert, cooperative    CHEST/BREASTS:  Breasts relatively symmetrical resolving erythema and hyperpigmentation in L lateral breat no breakdown, otherwise skin without lesion(s), no nipple retraction or dimpling, no nipple discharge, no masses palpated, no axillary or supraclavicular adenopathy    EXTREMITIES: NO cyanosis clubbing or lymphedema    IMPRESSION/PLAN:    Resolving dermatitis. Survivorship discussed. Written material given. Lucas in July. Kit Patience to order annual mammography at [facility]. Will get results of genetic testing next week    Thank you for allowing us to participate in the care of this patient.   Sincerely,    Electronically signed by Court Larson MD on 2/11/21 at 11:54 AM EST      cc:   MD Jf Pabon MD  Unk Breaker, DO [address] [facility]  [place],  [address]

## 2021-02-11 NOTE — PROGRESS NOTES
NURSING ASSESSMENT     Date: 2021        Patient Name: Evelin Atkins     YOB: 1945      Age:  76 y.o. MRN: [id number]     Chaperone [] Yes   [x] No      Advance Directives:   Do you currently have completed advance directives (living will)? [x] Yes   [] No         *If yes, please bring us a copy for your records. *If no, would you like info or assistance in completing advance directives (living will)? [] Yes   [x] No    Pain Score:   Pain Score (1-10): 0 at this time  Pain Location: Left breast  Pain Duration:  Occasional Twinge   Pain Management/Control: Tylenol      Is pain affecting your ability to take care of yourself or move throughout your home? [] Yes   [x] No    General: No Problems  Patient has gained weight [x] Yes   [] No  Patient has lost weight [] Yes   [x] No  How much weight in pounds and over what length of time: Up 1.8 lbs since last visit 2/3/21    Eyes (Ophthalmic): Floaters on occasion     Skin (Dermatological): Left breast area of brownish pink sink. ENT: No Problems     Respiratory: No Problems     Cardiovascular: No Problems      Device   [] Yes   [x] No   Copy of Card Obtained [] Yes   [x] No    Gastrointestinal: No Problems    Genito-Urinary   No Problems       Breast: S/P partial Left mastectomy     Musculoskeletal: No Problems    Neurological: No Problems      Hematological and Lymphatic: No Problems     Endocrine: Diabetes Mellitus and Thyroid Problems    Gyn History: See previous assessment  /Para:   Age of Menarche:   Age of Menopause  Vaginal Bleeding:    First Pregnancy:  Breast Feeding:    Last Menstrual period:   Oral Contraceptives:      Number of years:   Hormone Replacement therapy     Number of Years:   Last Pap Smear:   Last Mammogram    A 10-point review of systems has been conducted and pertinent positives have been   recorded.  All other review of systems are negative Was the patient admitted during the course of treatment OR within 30 days of treatment?  No    If yes:  Date of Admission:  Hospital:    Additional Comments:

## 2021-07-19 ENCOUNTER — OFFICE VISIT (OUTPATIENT)
Dept: PAIN MANAGEMENT | Age: 76
End: 2021-07-19
Payer: MEDICARE

## 2021-07-19 VITALS — BODY MASS INDEX: 30.29 KG/M2 | HEIGHT: 67 IN | TEMPERATURE: 97.2 F | WEIGHT: 193 LBS

## 2021-07-19 DIAGNOSIS — M79.604 BILATERAL LEG PAIN: Primary | ICD-10-CM

## 2021-07-19 DIAGNOSIS — M79.605 BILATERAL LEG PAIN: Primary | ICD-10-CM

## 2021-07-19 PROCEDURE — 95910 NRV CNDJ TEST 7-8 STUDIES: CPT | Performed by: PHYSICAL MEDICINE & REHABILITATION

## 2021-07-19 PROCEDURE — 95886 MUSC TEST DONE W/N TEST COMP: CPT | Performed by: PHYSICAL MEDICINE & REHABILITATION

## 2021-07-19 NOTE — PROGRESS NOTES
Electromyography (EMG)/Nerve conduction studies (NCS) Report: Lower Extremity    Name: Terese Morley   YOB: 1945  Date of Service: 7/19/2021   Provider: Mylene Lucas MD        INDICATIONS:  Terese Morley is a 68 y.o. female who presents for electrodiagnostic evaluation for lumbar radiculitis by request of Dr. Je Lane. Her main symptom for evaluation is bilateral leg pain. She confirms a history of diabetes mellitus, thyroid disease, and limited chemotherapy. Both limbs are necessary to examine in order to evaluate for any evidence of systemic disease as well as establish normal baseline values from which to compare any abnormal unilateral findings. The study is explained and verbal consent to proceed is obtained. NERVE CONDUCTION STUDIES:    Sensory nerve conduction studies: Bilateral sural and superficial peroneal sensory nerve conduction studies demonstrate normal distal latencies and amplitudes. Waveforms are limited in all studies. Bilateral lower limb temperatures are normal.     Motor nerve conduction studies: Bilateral peroneal motor nerve conduction studies with pickup over the extensor digitorum brevis demonstrate normal distal latencies and amplitudes. Bilateral tibial motor nerve conduction studies with pickup over the abductor hallucis demonstrate normal distal latencies and amplitudes. H reflex: Bilateral H reflexes are difficult to elicit. ELECTROMYOGRAPHY: A disposable monopolar needle is used to evaluate the right vastus medialis, tibialis anterior, extensor hallucis longus, flexor digitorum longus, peroneus longus, medial gastrocnemius, and lateral gastrocnemius. Right tibialis anterior demonstrates increased insertional activity, but no reliable abnormal spontaneous activity is found. All of the other muscles sampled are free of any increased insertional activity or any abnormal spontaneous activity. Motor unit recruitment is unremarkable.      A disposable monopolar needle is used to evaluate the left vastus medialis, tibialis anterior, extensor hallucis longus, flexor digitorum longus, peroneus longus, medial gastrocnemius, and lateral gastrocnemius. All of the muscles sampled are free of any increased insertional activity or any abnormal spontaneous activity. Motor unit recruitment is unremarkable. Bilateral low lumbar paraspinal muscle sampling is free of any increased insertional activity or any abnormal spontaneous activity. SUMMARY:  This study is limited, but normal:  1. There is no clear electrodiagnostic evidence for an active bilateral lumbosacral motor radiculopathy as clinically questioned. There are slight changes in a single Right L4-L5 muscle that do not reach threshold for diagnosis of radiculopathy. 2. Although limited, there is no clear evidence for a generalized large fiber sensorimotor peripheral polyneuropathy. RECOMMENDATIONS: Today's study does not explain the patient's bilateral leg pain. The patient should follow up with Dr. Ross Zavaleta as previously instructed. If her symptoms persist or worsen, further electrodiagnostic evaluation may be considered if the patient is agreeable. Clinical correlation is recommended.

## 2023-01-23 ENCOUNTER — INITIAL CONSULT (OUTPATIENT)
Dept: PAIN MANAGEMENT | Age: 78
End: 2023-01-23
Payer: COMMERCIAL

## 2023-01-23 VITALS
TEMPERATURE: 97.3 F | HEIGHT: 67 IN | SYSTOLIC BLOOD PRESSURE: 130 MMHG | BODY MASS INDEX: 31.08 KG/M2 | DIASTOLIC BLOOD PRESSURE: 68 MMHG | WEIGHT: 198 LBS

## 2023-01-23 DIAGNOSIS — M47.817 LUMBOSACRAL SPONDYLOSIS WITHOUT MYELOPATHY: Primary | ICD-10-CM

## 2023-01-23 PROCEDURE — 1036F TOBACCO NON-USER: CPT | Performed by: PAIN MEDICINE

## 2023-01-23 PROCEDURE — 1123F ACP DISCUSS/DSCN MKR DOCD: CPT | Performed by: PAIN MEDICINE

## 2023-01-23 PROCEDURE — G8417 CALC BMI ABV UP PARAM F/U: HCPCS | Performed by: PAIN MEDICINE

## 2023-01-23 PROCEDURE — G8484 FLU IMMUNIZE NO ADMIN: HCPCS | Performed by: PAIN MEDICINE

## 2023-01-23 PROCEDURE — 1090F PRES/ABSN URINE INCON ASSESS: CPT | Performed by: PAIN MEDICINE

## 2023-01-23 PROCEDURE — G8427 DOCREV CUR MEDS BY ELIG CLIN: HCPCS | Performed by: PAIN MEDICINE

## 2023-01-23 PROCEDURE — G8399 PT W/DXA RESULTS DOCUMENT: HCPCS | Performed by: PAIN MEDICINE

## 2023-01-23 PROCEDURE — 99204 OFFICE O/P NEW MOD 45 MIN: CPT | Performed by: PAIN MEDICINE

## 2023-01-23 NOTE — PROGRESS NOTES
[facility]) Physicians  Neurosurgery and Pain [facility]  [address], [address] [facility] , [address] [facility], [facility]: [phone]  F: [phone]        Eleanor Perez  ([birth year])    1/23/2023    Subjective:     Eleanor Perez is 68 y.o. female who complains today of:     Chief Complaint   Patient presents with    Back Pain     Patient here today for initial evaluation. No back surgery, she is diabetic, not on blood thinners, she does not work. Right-sided low back pain for the last 2 to 3 months better with sitting laying worse with walking. She does not smoke. She had x-rays which show some degenerative changes. No recent physical therapy. Pain affects her quality life achy sharp pain. No weakness. Plan: We discussed options. She is tender in the right lower lumbar spine we will try to authorize right L3-4-5 medial branch blocks with steroid. X-rays were reviewed. She understands plan is in agreement. Questions answered chart was reviewed. Anatomic model of pathology was shown. She may be a candidate for radiofrequency ablation in the future.     Allergies:  Prochlorperazine, Actos [pioglitazone hydrochloride], Codeine, Prochlorperazine edisylate, and Sulfa antibiotics    Past Medical History:   Diagnosis Date    Allergic rhinitis, seasonal     Arthritis     NECK    Breast cancer, left breast ([facility] [address] 09/2020    Bursitis, subacromial/subdeltoid     C6 radiculopathy     Coronary artery disease involving native coronary artery of native heart without angina pectoris 2/12/2019    Degenerative joint disease of wrist, right     GERD (gastroesophageal reflux disease)     Glaucoma     H pylori ulcer     Hiatal hernia     Hypertension     Hypothyroidism     Impaired fasting blood sugar     Leg cramps     Non-rheumatic mitral regurgitation 2/12/2019    Vitamin B 12 deficiency      Past Surgical History:   Procedure Laterality Date    APPENDECTOMY      BREAST SURGERY      LUMP REMOVED    CHOLECYSTECTOMY      CYST REMOVAL Right     EYE SURGERY      HYSTERECTOMY      JOINT REPLACEMENT Right     knee     Family History   Problem Relation Age of Onset    Arthritis Mother     Cancer Father     Cancer Sister     Breast Cancer Sister     Cancer Maternal Aunt         female    Coronary Art Dis Son      Social History     Socioeconomic History    Marital status:      Spouse name: Not on file    Number of children: Not on file    Years of education: Not on file    Highest education level: Not on file   Occupational History    Not on file   Tobacco Use    Smoking status: Never    Smokeless tobacco: Never   Vaping Use    Vaping Use: Never used   Substance and Sexual Activity    Alcohol use: No     Alcohol/week: 0.0 standard drinks    Drug use: No    Sexual activity: Not on file   Other Topics Concern    Not on file   Social History Narrative    Not on file     Social Determinants of Health     Financial Resource Strain: Not on file   Food Insecurity: Not on file   Transportation Needs: Not on file   Physical Activity: Not on file   Stress: Not on file   Social Connections: Not on file   Intimate Partner Violence: Not on file   Housing Stability: Not on file       Current Outpatient Medications on File Prior to Visit   Medication Sig Dispense Refill    furosemide (LASIX) 20 MG tablet Take 20 mg by mouth daily      anastrozole (ARIMIDEX) 1 MG tablet Take 1 tablet by mouth daily      rivaroxaban (XARELTO) 20 MG TABS tablet Take 20 mg by mouth daily       pregabalin (LYRICA) 150 MG capsule Take 1 capsule by mouth 2 times daily for 180 days. . 180 capsule 1    levothyroxine (SYNTHROID) 88 MCG tablet TAKE 1 TABLET DAILY 90 tablet 3    metFORMIN (GLUCOPHAGE) 500 MG tablet Take 1 tablet by mouth 2 times daily (with meals) 180 tablet 3    Probiotic Product (PROBIOTIC DAILY PO) Take by mouth      cyanocobalamin 1000 MCG tablet Take 1,000 mcg by mouth daily.       VITAMIN D-3 (COLECALCIFEROL) 400 UNITS TABS Take  by mouth daily. No current facility-administered medications on file prior to visit. HPI    Review of Systems   All other systems reviewed and are negative. Objective:     Vitals:  /68   Temp 97.3 °F (36.3 °C)   Ht 5' 7\" (1.702 m)   Wt 198 lb (89.8 kg)   LMP 12/17/1984   BMI 31.01 kg/m² Pain Score:   6      Physical Exam  Patient is AO X 3 , recent and remote memory is intact,mood and affect normal,judgement and insight normal. Head normacephalic,eyes - PERRLA, EOMI, No obvious external Ears, Nose, mouth masses seen, neck supple, trachae midline, no abnormal lymph nodes palpated in neck or supraclavicular region. CN's 2-12 grossly intact. Strength functional for ambulation, balance functional, coordination normal. Visualized skin intact, no obvious lesion or visualized cyanosis. No swelling. Pulses intact. No obvious upper motor neuron signs. Sensation grossly intact to light touch. Strength functional upper extremities. SLR negative. Tender along R lumber facet joints with pain and decreased ROM. Extension and rotation with muscle spasms. Assessment:      Diagnosis Orders   1.  Lumbosacral spondylosis without myelopathy  TN NJX DX/THER AGT PVRT FACET JT LMBR/SAC 1 LEVEL    TN NJX DX/THER AGT PVRT FACET JT LMBR/SAC 2ND LEVEL          Plan:

## 2023-01-24 ENCOUNTER — TELEPHONE (OUTPATIENT)
Dept: PAIN MANAGEMENT | Age: 78
End: 2023-01-24

## 2023-01-24 NOTE — TELEPHONE ENCOUNTER
RIGHT L3,4,5 MBB    NO AUTH REQUIRED    OK to schedule procedure approved as above. Please note sides/levels approved and date range.    (If applicable, sides/levels approved may differ from those ordered)     [address]

## 2023-01-30 ENCOUNTER — PROCEDURE VISIT (OUTPATIENT)
Dept: PAIN MANAGEMENT | Age: 78
End: 2023-01-30
Payer: COMMERCIAL

## 2023-01-30 DIAGNOSIS — M47.817 LUMBOSACRAL SPONDYLOSIS WITHOUT MYELOPATHY: Primary | ICD-10-CM

## 2023-01-30 PROCEDURE — 64493 INJ PARAVERT F JNT L/S 1 LEV: CPT | Performed by: PAIN MEDICINE

## 2023-01-30 PROCEDURE — 64494 INJ PARAVERT F JNT L/S 2 LEV: CPT | Performed by: PAIN MEDICINE

## 2023-01-30 RX ORDER — BETAMETHASONE SODIUM PHOSPHATE AND BETAMETHASONE ACETATE 3; 3 MG/ML; MG/ML
6 INJECTION, SUSPENSION INTRA-ARTICULAR; INTRALESIONAL; INTRAMUSCULAR; SOFT TISSUE ONCE
Status: COMPLETED | OUTPATIENT
Start: 2023-01-30 | End: 2023-01-30

## 2023-01-30 RX ORDER — LIDOCAINE HYDROCHLORIDE 10 MG/ML
10 INJECTION, SOLUTION EPIDURAL; INFILTRATION; INTRACAUDAL; PERINEURAL ONCE
Status: COMPLETED | OUTPATIENT
Start: 2023-01-30 | End: 2023-01-30

## 2023-01-30 RX ADMIN — LIDOCAINE HYDROCHLORIDE 10 MG: 10 INJECTION, SOLUTION EPIDURAL; INFILTRATION; INTRACAUDAL; PERINEURAL at 13:28

## 2023-01-30 RX ADMIN — BETAMETHASONE SODIUM PHOSPHATE AND BETAMETHASONE ACETATE 6 MG: 3; 3 INJECTION, SUSPENSION INTRA-ARTICULAR; INTRALESIONAL; INTRAMUSCULAR; SOFT TISSUE at 13:29

## 2023-01-30 NOTE — PROGRESS NOTES
[facility]) Physicians  Neurosurgery and Pain [facility]  [address], [address] [facility] [place] [facility] , [address] [facility], [place] [address]: [phone]  F: [phone]      [address]      Provider: Diana Bernard DO          Patient Name: Ana Harley : [id number]        Date: 2023       Ana Harley is here today for interventional pain management. Standard ASIPP guidelines were followed and sterile technique used. Area was cleaned with Betadine x3. Informed consent was obtained. Fluoroscopic guidance was used for this procedure. Junction of superior articular process and transverse process was identified. For L5 dorsal primary ramus groove of sacral ala and SAP of S1 was identified. Appropriate length spinal needle was used and advanced to correct anatomic location. Negative aspiration was achieved. At each site approximately 1/2cc of 1% preservative free Lidocaine was injected without difficulty. 6 mg Celestone added    Patient tolerated the procedure well, no obvious complications occurred during the procedure. Patient was appropriately monitored and discharged home in stable condition with their usual motor strength. The patient will keep close track of pain over the next several hours and call our office tomorrow and let us know what percentage of pain relief is experienced. Post op Instructions were given to patient. Relevant and recent imaging reviewed with patient today. [] Bilateral [] T12 [] S1      [] L1 [] S2     [x] Right [] L2 [] S3      [x] L3      [] Left [x] L4         [x] L5 [] S. I.                        May need second MBB    Diana Bernard DO

## 2023-02-20 ENCOUNTER — OFFICE VISIT (OUTPATIENT)
Dept: PAIN MANAGEMENT | Age: 78
End: 2023-02-20
Payer: MEDICARE

## 2023-02-20 VITALS
WEIGHT: 200 LBS | HEIGHT: 67 IN | SYSTOLIC BLOOD PRESSURE: 136 MMHG | BODY MASS INDEX: 31.39 KG/M2 | TEMPERATURE: 96.9 F | DIASTOLIC BLOOD PRESSURE: 66 MMHG

## 2023-02-20 DIAGNOSIS — M47.817 LUMBOSACRAL SPONDYLOSIS WITHOUT MYELOPATHY: ICD-10-CM

## 2023-02-20 DIAGNOSIS — M46.1 SACROILIITIS (HCC): Primary | ICD-10-CM

## 2023-02-20 PROBLEM — I47.1 SUPRAVENTRICULAR TACHYCARDIA (HCC): Status: ACTIVE | Noted: 2019-01-30

## 2023-02-20 PROBLEM — R42 VERTIGO: Status: ACTIVE | Noted: 2022-03-07

## 2023-02-20 PROBLEM — I47.10 SUPRAVENTRICULAR TACHYCARDIA: Status: ACTIVE | Noted: 2019-01-30

## 2023-02-20 PROBLEM — M70.72 BURSITIS OF LEFT HIP: Status: ACTIVE | Noted: 2023-02-20

## 2023-02-20 PROBLEM — F41.1 GENERALIZED ANXIETY DISORDER: Status: ACTIVE | Noted: 2023-02-20

## 2023-02-20 PROCEDURE — 1036F TOBACCO NON-USER: CPT | Performed by: NURSE PRACTITIONER

## 2023-02-20 PROCEDURE — 99214 OFFICE O/P EST MOD 30 MIN: CPT | Performed by: NURSE PRACTITIONER

## 2023-02-20 PROCEDURE — 1123F ACP DISCUSS/DSCN MKR DOCD: CPT | Performed by: NURSE PRACTITIONER

## 2023-02-20 PROCEDURE — G8484 FLU IMMUNIZE NO ADMIN: HCPCS | Performed by: NURSE PRACTITIONER

## 2023-02-20 PROCEDURE — 1090F PRES/ABSN URINE INCON ASSESS: CPT | Performed by: NURSE PRACTITIONER

## 2023-02-20 PROCEDURE — G8399 PT W/DXA RESULTS DOCUMENT: HCPCS | Performed by: NURSE PRACTITIONER

## 2023-02-20 PROCEDURE — G8417 CALC BMI ABV UP PARAM F/U: HCPCS | Performed by: NURSE PRACTITIONER

## 2023-02-20 PROCEDURE — G8427 DOCREV CUR MEDS BY ELIG CLIN: HCPCS | Performed by: NURSE PRACTITIONER

## 2023-02-20 ASSESSMENT — ENCOUNTER SYMPTOMS
COUGH: 0
SHORTNESS OF BREATH: 0
GASTROINTESTINAL NEGATIVE: 1
DIARRHEA: 0
EYES NEGATIVE: 1
TROUBLE SWALLOWING: 0
CONSTIPATION: 0
BACK PAIN: 1

## 2023-02-20 NOTE — PROGRESS NOTES
Ana Ellis  ([birth year])    2/20/2023    Subjective:     Ana Ellis is 68 y.o. female who complains today of:    Chief Complaint   Patient presents with    Hip Pain         Allergies:  Prochlorperazine, Actos [pioglitazone hydrochloride], Codeine, Diclofenac, Pioglitazone, Prochlorperazine edisylate, and Sulfa antibiotics    Past Medical History:   Diagnosis Date    Allergic rhinitis, seasonal     Arthritis     NECK    Breast cancer, left breast ([address] 09/2020    Bursitis, subacromial/subdeltoid     C6 radiculopathy     Coronary artery disease involving native coronary artery of native heart without angina pectoris 2/12/2019    Degenerative joint disease of wrist, right     GERD (gastroesophageal reflux disease)     Glaucoma     H pylori ulcer     Hiatal hernia     Hypertension     Hypothyroidism     Impaired fasting blood sugar     Leg cramps     Non-rheumatic mitral regurgitation 2/12/2019    Vitamin B 12 deficiency      Past Surgical History:   Procedure Laterality Date    APPENDECTOMY      BREAST SURGERY      LUMP REMOVED    CHOLECYSTECTOMY      CYST REMOVAL Right     EYE SURGERY      HYSTERECTOMY      JOINT REPLACEMENT Right     knee     Family History   Problem Relation Age of Onset    Arthritis Mother     Cancer Father     Cancer Sister     Breast Cancer Sister     Cancer Maternal Aunt         female    Coronary Art Dis Son      Social History     Socioeconomic History    Marital status:      Spouse name: Not on file    Number of children: Not on file    Years of education: Not on file    Highest education level: Not on file   Occupational History    Not on file   Tobacco Use    Smoking status: Never    Smokeless tobacco: Never   Vaping Use    Vaping Use: Never used   Substance and Sexual Activity    Alcohol use: No     Alcohol/week: 0.0 standard drinks    Drug use: No    Sexual activity: Not on file   Other Topics Concern    Not on file   Social History Narrative    Not on file     Social Determinants of Health     Financial Resource Strain: Not on file   Food Insecurity: Not on file   Transportation Needs: Not on file   Physical Activity: Not on file   Stress: Not on file   Social Connections: Not on file   Intimate Partner Violence: Not on file   Housing Stability: Not on file       Current Outpatient Medications on File Prior to Visit   Medication Sig Dispense Refill    ZINC PO Take by mouth      CALCIUM PO Take by mouth      anastrozole (ARIMIDEX) 1 MG tablet Take 1 tablet by mouth daily      levothyroxine (SYNTHROID) 88 MCG tablet TAKE 1 TABLET DAILY 90 tablet 3    metFORMIN (GLUCOPHAGE) 500 MG tablet Take 1 tablet by mouth 2 times daily (with meals) 180 tablet 3    Probiotic Product (PROBIOTIC DAILY PO) Take by mouth      cyanocobalamin 1000 MCG tablet Take 1,000 mcg by mouth daily.      VITAMIN D-3 (COLECALCIFEROL) 400 UNITS TABS Take  by mouth daily.      furosemide (LASIX) 20 MG tablet Take 20 mg by mouth daily (Patient not taking: Reported on 2/20/2023)      rivaroxaban (XARELTO) 20 MG TABS tablet Take 20 mg by mouth daily  (Patient not taking: Reported on 2/20/2023)      pregabalin (LYRICA) 150 MG capsule Take 1 capsule by mouth 2 times daily for 180 days.. 180 capsule 1     No current facility-administered medications on file prior to visit.           Pt presents today for a f/u of her pain.  PCP is Dr. Gibbs.  Patient was last seen by Dr. Turk on 1/30/2023 for right L3-4-5 MBB with Celestone added she says this helped significantly but only lasted a day or so.  She had initial consult with Dr. Turk on 1/23/2023 for complaints of right-sided low back pain.  She felt walking was the worst, laying and sitting helped.  She denies smoking cigarettes.  Denies back surgery or blood thinners.  She does not work.  Denies physical therapy.  X-ray report of low back from January 2023 shows grade 1 retrolisthesis of L3 on L4 and L2 on L3.  Discogenic  degenerative changes noted as well  with osteophytes at multiple levels. Pt feels pain level 5/10. Pt feels that standing, walking makes the pain worse, and sitting, laying makes the pain better. Pt denies radiating numbness and tingling. Denies recent falls, injuries or trauma. Pt denies new weakness. Pt reports PT has not been done. Review of Systems   Constitutional: Negative. Negative for fatigue. HENT: Negative. Negative for trouble swallowing. Eyes: Negative. Respiratory:  Negative for cough and shortness of breath. Cardiovascular:  Negative for chest pain. Gastrointestinal: Negative. Negative for constipation and diarrhea. Endocrine: Negative. Genitourinary: Negative. Musculoskeletal:  Positive for arthralgias and back pain. Skin: Negative. Neurological:  Negative for dizziness, weakness and headaches. Hematological: Negative. Psychiatric/Behavioral: Negative. Objective:     Vitals:  /66 (Site: Right Upper Arm)   Temp 96.9 °F (36.1 °C) (Temporal)   Ht 5' 7\" (1.702 m)   Wt 200 lb (90.7 kg)   LMP 12/17/1984   BMI 31.32 kg/m² Pain Score:   5      Physical Exam  Vitals and nursing note reviewed. This is a pleasant female who answers questions appropriately and follows commands. Pt is alert and oriented x 3. Recent and remote memory is intact. Mood and affect, judgement and insight are normal.  No signs of distress, no dyspnea or SOB noted. HEENT: PERRL. Neck is supple, trachea midline. No lymphadenopathy noted. Decreased ROM with flexion and extension of low back. Tender with palpation to lumbar spine  on the Rt but more so Rt SI joint with palpation with positive provacative maneuvers noted. Positive Jameel's test, modified Gaenslen test, thigh thrust, and distraction test on Rt noted on exam.  Negative SLR. Tightness in both hamstrings noted. Balance and coordination normal.  Strength is functional for ambulation. Cranial nerves II-XII are intact.          Assessment: Diagnosis Orders   1. Sacroiliitis (HCC)  VT INJECT SI JOINT ARTHRGRPHY&/ANES/STEROID W/NAHEED    CHG FLUOR NEEDLE/CATH SPINE/PARASPINAL DX/THER ADDON      2. Lumbosacral spondylosis without myelopathy            Plan:          No orders of the defined types were placed in this encounter. Orders Placed This Encounter   Procedures    CHG FLUOR NEEDLE/CATH SPINE/PARASPINAL DX/THER ADDON     Standing Status:   Future     Standing Expiration Date:   5/21/2023    VT INJECT SI JOINT ARTHRGRPHY&/ANES/STEROID W/NAHEED     Rt SI joint inj with SK     Standing Status:   Future     Standing Expiration Date:   5/21/2023       Discussed options with the patient today. Anatomic model pathology was shown and reviewed with pt. She did have short term limited significant relief with lumbar MBB. May need 2nd MBB as discussed, but she is having more Rt sided SI joint pain today. Will order Rt SI joint injection with Dr. Bella Cruz to settle her Sx. Offered PT - she declined says \"I don't want to do it\". Exercises given for SI. All questions were answered. Discussed home exercise program.  Relevant imaging and pain generators reviewed. Pt verbalized understanding and agrees with above plan. Pt has chronic pain. Liban Patella OARRS was reviewed. This NP saw pt under direct supervision of Dr. Bella Cruz. Follow up:  Return for for procedure.     Maisha Riddle, APRN - CNP

## 2023-02-21 ENCOUNTER — TELEPHONE (OUTPATIENT)
Dept: PAIN MANAGEMENT | Age: 78
End: 2023-02-21

## 2023-02-21 NOTE — TELEPHONE ENCOUNTER
Patient called stating that she decided she does not want the Rt SI joint inj with SK.  She says she wants to try the exercises she was given first.

## 2023-03-26 DIAGNOSIS — E11.42 TYPE 2 DIABETES MELLITUS WITH PERIPHERAL NEUROPATHY (MULTI): Primary | ICD-10-CM

## 2023-03-26 DIAGNOSIS — E03.9 HYPOTHYROIDISM, UNSPECIFIED TYPE: ICD-10-CM

## 2023-03-27 PROBLEM — E03.9 HYPOTHYROIDISM: Status: ACTIVE | Noted: 2023-03-27

## 2023-03-27 PROBLEM — E11.42 TYPE 2 DIABETES MELLITUS WITH PERIPHERAL NEUROPATHY (MULTI): Status: ACTIVE | Noted: 2023-03-27

## 2023-03-27 RX ORDER — METFORMIN HYDROCHLORIDE 500 MG/1
1 TABLET ORAL 2 TIMES DAILY
COMMUNITY
Start: 2019-05-13 | End: 2023-03-28 | Stop reason: SDUPTHER

## 2023-03-27 RX ORDER — LEVOTHYROXINE SODIUM 88 UG/1
1 TABLET ORAL DAILY
COMMUNITY
Start: 2020-12-20 | End: 2023-03-28 | Stop reason: SDUPTHER

## 2023-03-28 RX ORDER — METFORMIN HYDROCHLORIDE 500 MG/1
TABLET ORAL
Qty: 180 TABLET | Refills: 3 | Status: SHIPPED | OUTPATIENT
Start: 2023-03-28

## 2023-03-28 RX ORDER — LEVOTHYROXINE SODIUM 88 UG/1
TABLET ORAL
Qty: 90 TABLET | Refills: 3 | Status: SHIPPED | OUTPATIENT
Start: 2023-03-28

## 2023-03-30 ENCOUNTER — TELEPHONE (OUTPATIENT)
Dept: PRIMARY CARE | Facility: CLINIC | Age: 78
End: 2023-03-30
Payer: MEDICARE

## 2023-03-30 NOTE — TELEPHONE ENCOUNTER
Patient called requesting an antibiotic for sinus infection. Patient stated she has been sick for 6 days and nothing OTC is helping.   Send to [facility]  Lov 11/15/22

## 2023-04-01 ENCOUNTER — OFFICE VISIT (OUTPATIENT)
Dept: PRIMARY CARE | Facility: CLINIC | Age: 78
End: 2023-04-01
Payer: MEDICARE

## 2023-04-01 VITALS
BODY MASS INDEX: 31.64 KG/M2 | TEMPERATURE: 97.2 F | RESPIRATION RATE: 16 BRPM | HEART RATE: 65 BPM | WEIGHT: 202 LBS | SYSTOLIC BLOOD PRESSURE: 145 MMHG | DIASTOLIC BLOOD PRESSURE: 83 MMHG | OXYGEN SATURATION: 97 %

## 2023-04-01 DIAGNOSIS — J01.40 ACUTE NON-RECURRENT PANSINUSITIS: Primary | ICD-10-CM

## 2023-04-01 PROBLEM — R42 VERTIGO: Status: ACTIVE | Noted: 2022-03-07

## 2023-04-01 PROBLEM — I34.0 NON-RHEUMATIC MITRAL REGURGITATION: Status: ACTIVE | Noted: 2019-02-12

## 2023-04-01 PROBLEM — F41.1 GENERALIZED ANXIETY DISORDER: Status: ACTIVE | Noted: 2023-02-20

## 2023-04-01 PROBLEM — R60.9 PERIPHERAL EDEMA: Status: ACTIVE | Noted: 2023-04-01

## 2023-04-01 PROBLEM — M19.031: Status: ACTIVE | Noted: 2018-01-14

## 2023-04-01 PROBLEM — M54.12 C6 RADICULOPATHY: Status: ACTIVE | Noted: 2018-01-14

## 2023-04-01 PROBLEM — M70.72 BURSITIS OF LEFT HIP: Status: ACTIVE | Noted: 2023-02-20

## 2023-04-01 PROBLEM — R60.0 PERIPHERAL EDEMA: Status: ACTIVE | Noted: 2023-04-01

## 2023-04-01 PROBLEM — K21.9 GERD WITHOUT ESOPHAGITIS: Status: ACTIVE | Noted: 2023-04-01

## 2023-04-01 PROBLEM — I25.10 ARTERIOSCLEROSIS OF CORONARY ARTERY: Status: ACTIVE | Noted: 2019-02-12

## 2023-04-01 PROBLEM — J30.2 ALLERGIC RHINITIS, SEASONAL: Status: ACTIVE | Noted: 2018-01-14

## 2023-04-01 PROBLEM — M17.12 LEFT KNEE DJD: Status: ACTIVE | Noted: 2023-04-01

## 2023-04-01 PROBLEM — J30.2 SEASONAL ALLERGIES: Status: ACTIVE | Noted: 2023-04-01

## 2023-04-01 PROBLEM — M54.16 LUMBAR RADICULITIS: Status: ACTIVE | Noted: 2023-04-01

## 2023-04-01 PROBLEM — I47.10 SUPRAVENTRICULAR TACHYCARDIA (CMS-HCC): Status: ACTIVE | Noted: 2019-01-30

## 2023-04-01 PROBLEM — E53.8 VITAMIN B 12 DEFICIENCY: Status: ACTIVE | Noted: 2018-01-14

## 2023-04-01 PROBLEM — M19.049 CMC ARTHRITIS: Status: ACTIVE | Noted: 2023-04-01

## 2023-04-01 PROBLEM — C50.912 INFILTRATING DUCTAL CARCINOMA OF LEFT FEMALE BREAST (MULTI): Status: ACTIVE | Noted: 2023-04-01

## 2023-04-01 PROCEDURE — 1159F MED LIST DOCD IN RCRD: CPT | Performed by: NURSE PRACTITIONER

## 2023-04-01 PROCEDURE — 1036F TOBACCO NON-USER: CPT | Performed by: NURSE PRACTITIONER

## 2023-04-01 PROCEDURE — 1160F RVW MEDS BY RX/DR IN RCRD: CPT | Performed by: NURSE PRACTITIONER

## 2023-04-01 PROCEDURE — 99213 OFFICE O/P EST LOW 20 MIN: CPT | Performed by: NURSE PRACTITIONER

## 2023-04-01 RX ORDER — DULOXETIN HYDROCHLORIDE 30 MG/1
30 CAPSULE, DELAYED RELEASE ORAL
COMMUNITY
Start: 2022-07-12

## 2023-04-01 RX ORDER — PHENYLEPHRINE HCL 10 MG
500 TABLET ORAL
COMMUNITY
Start: 2020-03-06

## 2023-04-01 RX ORDER — DIPHENOXYLATE HYDROCHLORIDE AND ATROPINE SULFATE 2.5; .025 MG/1; MG/1
TABLET ORAL
COMMUNITY
Start: 2020-11-25

## 2023-04-01 RX ORDER — MECLIZINE HYDROCHLORIDE 25 MG/1
25 TABLET ORAL EVERY 8 HOURS
COMMUNITY
Start: 2022-03-07

## 2023-04-01 RX ORDER — POLYMYXIN B SULFATE AND TRIMETHOPRIM 1; 10000 MG/ML; [USP'U]/ML
1 SOLUTION OPHTHALMIC 3 TIMES DAILY
COMMUNITY
Start: 2023-01-11

## 2023-04-01 RX ORDER — MINERAL OIL
180 ENEMA (ML) RECTAL DAILY
COMMUNITY
Start: 2022-04-20

## 2023-04-01 RX ORDER — FUROSEMIDE 40 MG/1
40 TABLET ORAL DAILY
COMMUNITY
Start: 2022-09-24

## 2023-04-01 RX ORDER — FUROSEMIDE 20 MG/1
20 TABLET ORAL DAILY
COMMUNITY

## 2023-04-01 RX ORDER — DEXAMETHASONE 4 MG/1
4 TABLET ORAL
COMMUNITY
Start: 2020-11-06

## 2023-04-01 RX ORDER — AMOXICILLIN AND CLAVULANATE POTASSIUM 875; 125 MG/1; MG/1
1 TABLET, FILM COATED ORAL 2 TIMES DAILY
Qty: 20 TABLET | Refills: 0 | Status: SHIPPED | OUTPATIENT
Start: 2023-04-01 | End: 2023-04-11

## 2023-04-01 RX ORDER — PREGABALIN 150 MG/1
150 CAPSULE ORAL 2 TIMES DAILY
COMMUNITY
Start: 2019-01-23 | End: 2023-07-03 | Stop reason: SDUPTHER

## 2023-04-01 RX ORDER — PANTOPRAZOLE SODIUM 40 MG/1
40 TABLET, DELAYED RELEASE ORAL AS NEEDED
COMMUNITY
Start: 2019-12-26

## 2023-04-01 RX ORDER — ACETAMINOPHEN 500 MG
50 TABLET ORAL
COMMUNITY

## 2023-04-01 RX ORDER — ACETAMINOPHEN 325 MG/1
325 TABLET ORAL EVERY 6 HOURS
COMMUNITY
Start: 2019-05-03

## 2023-04-01 RX ORDER — ONDANSETRON 4 MG/1
TABLET, FILM COATED ORAL
COMMUNITY
Start: 2020-11-06

## 2023-04-01 RX ORDER — ANASTROZOLE 1 MG/1
1 TABLET ORAL DAILY
COMMUNITY
Start: 2021-04-20

## 2023-04-01 RX ORDER — CELECOXIB 200 MG/1
200 CAPSULE ORAL
COMMUNITY
Start: 2023-01-18

## 2023-04-01 RX ORDER — BACLOFEN 10 MG/1
10 TABLET ORAL
COMMUNITY
Start: 2023-02-04

## 2023-04-01 ASSESSMENT — ENCOUNTER SYMPTOMS
COUGH: 0
SINUS PAIN: 1
SORE THROAT: 0
SHORTNESS OF BREATH: 0
WHEEZING: 0
CHEST TIGHTNESS: 0
SWOLLEN GLANDS: 0
FEVER: 0
SINUS PRESSURE: 1
CHILLS: 0

## 2023-04-01 NOTE — PROGRESS NOTES
Subjective   Patient ID: Kavya Anthony is a 77 y.o. female who presents for Sinusitis.  Patient presents to office with complain of sinus pressure, congestion x 10 days.  Patient denies chest pain, shortness of breath, fever, sore throat, nausea, vomiting nor diarrhea.  Patient is not taking any OTC medications for relief.     Sinusitis  This is a new problem. The current episode started 1 to 4 weeks ago. The problem is unchanged. There has been no fever. Associated symptoms include congestion, ear pain and sinus pressure. Pertinent negatives include no chills, coughing, shortness of breath, sore throat or swollen glands. Past treatments include nothing.       Review of Systems   Constitutional:  Negative for chills and fever.   HENT:  Positive for congestion, ear pain, sinus pressure and sinus pain. Negative for sore throat.    Respiratory:  Negative for cough, chest tightness, shortness of breath and wheezing.        /83   Pulse 65   Temp 36.2 °C (97.2 °F)   Resp 16   Wt 91.6 kg (202 lb)   SpO2 97%   BMI 31.64 kg/m²     Objective   Physical Exam  Vitals and nursing note reviewed.   Constitutional:       Appearance: Normal appearance.   HENT:      Head: Normocephalic and atraumatic.      Right Ear: Tympanic membrane, ear canal and external ear normal.      Left Ear: Tympanic membrane, ear canal and external ear normal.      Nose: Congestion and rhinorrhea present.      Comments: Moderate erythema bilateral nares. Tenderness over maxillary and frontal sinuses.      Mouth/Throat:      Mouth: Mucous membranes are moist.      Pharynx: Oropharynx is clear.   Eyes:      Extraocular Movements: Extraocular movements intact.      Conjunctiva/sclera: Conjunctivae normal.      Pupils: Pupils are equal, round, and reactive to light.   Cardiovascular:      Rate and Rhythm: Normal rate and regular rhythm.      Pulses: Normal pulses.      Heart sounds: Normal heart sounds.   Pulmonary:      Effort: Pulmonary effort  is normal. No respiratory distress.      Breath sounds: Normal breath sounds. No stridor. No wheezing, rhonchi or rales.   Chest:      Chest wall: No tenderness.   Abdominal:      General: Bowel sounds are normal.      Palpations: Abdomen is soft.   Musculoskeletal:         General: Normal range of motion.      Cervical back: Normal range of motion and neck supple.   Skin:     General: Skin is warm and dry.      Capillary Refill: Capillary refill takes less than 2 seconds.   Neurological:      General: No focal deficit present.      Mental Status: She is alert and oriented to person, place, and time.   Psychiatric:         Mood and Affect: Mood normal.         Behavior: Behavior normal.         Thought Content: Thought content normal.         Judgment: Judgment normal.         Assessment/Plan   Problem List Items Addressed This Visit    None

## 2023-04-01 NOTE — PROGRESS NOTES
Patient's PCP is Adonis Simms MD      COLD SYMPTOMS  At home Covid-19 test: NO   Symptoms:  Runny nose, Congestion,Headache, Sinus pressure on forehead and under eyes, LEFT  Ear ache, SOB, Nausea, Chills, Fatigue,    Length of Symptoms: 1 Week ago   Denies:  Fever, Post Nasal Drainage, Dry/Productive Cough, Sore throat, Wheezing, Diarrhea, Vomiting, Body aches, Sneezing  Factors that effect symptoms: Sudafed,     --Related Information--   Covid-19 Vaccinated:  NO

## 2023-05-02 ENCOUNTER — OFFICE VISIT (OUTPATIENT)
Dept: PRIMARY CARE | Facility: CLINIC | Age: 78
End: 2023-05-02
Payer: MEDICARE

## 2023-05-02 VITALS
WEIGHT: 199 LBS | SYSTOLIC BLOOD PRESSURE: 171 MMHG | DIASTOLIC BLOOD PRESSURE: 84 MMHG | OXYGEN SATURATION: 98 % | HEART RATE: 72 BPM | BODY MASS INDEX: 31.17 KG/M2 | TEMPERATURE: 97.2 F | RESPIRATION RATE: 16 BRPM

## 2023-05-02 DIAGNOSIS — R05.8 PRODUCTIVE COUGH: ICD-10-CM

## 2023-05-02 DIAGNOSIS — J01.40 ACUTE NON-RECURRENT PANSINUSITIS: Primary | ICD-10-CM

## 2023-05-02 PROBLEM — I25.10 CORONARY ARTERY DISEASE INVOLVING NATIVE CORONARY ARTERY OF NATIVE HEART WITHOUT ANGINA PECTORIS: Status: ACTIVE | Noted: 2019-02-12

## 2023-05-02 PROCEDURE — 99213 OFFICE O/P EST LOW 20 MIN: CPT | Performed by: NURSE PRACTITIONER

## 2023-05-02 PROCEDURE — 1159F MED LIST DOCD IN RCRD: CPT | Performed by: NURSE PRACTITIONER

## 2023-05-02 PROCEDURE — 1036F TOBACCO NON-USER: CPT | Performed by: NURSE PRACTITIONER

## 2023-05-02 PROCEDURE — 1160F RVW MEDS BY RX/DR IN RCRD: CPT | Performed by: NURSE PRACTITIONER

## 2023-05-02 RX ORDER — DOXYCYCLINE 100 MG/1
100 CAPSULE ORAL 2 TIMES DAILY
Qty: 20 CAPSULE | Refills: 0 | Status: SHIPPED | OUTPATIENT
Start: 2023-05-02 | End: 2023-05-16 | Stop reason: WASHOUT

## 2023-05-02 ASSESSMENT — ENCOUNTER SYMPTOMS
CONSTITUTIONAL NEGATIVE: 1
COUGH: 1
SHORTNESS OF BREATH: 0
ALLERGIC/IMMUNOLOGIC NEGATIVE: 1
GASTROINTESTINAL NEGATIVE: 1
DIARRHEA: 0
PSYCHIATRIC NEGATIVE: 1
ABDOMINAL PAIN: 0
MUSCULOSKELETAL NEGATIVE: 1
SORE THROAT: 0
CARDIOVASCULAR NEGATIVE: 1
NECK PAIN: 0
HEMATOLOGIC/LYMPHATIC NEGATIVE: 1
DEPRESSION: 0
HEADACHES: 0
RHINORRHEA: 1
CHEST TIGHTNESS: 0
ENDOCRINE NEGATIVE: 1
WHEEZING: 0
EYES NEGATIVE: 1
OCCASIONAL FEELINGS OF UNSTEADINESS: 0
VOMITING: 0
LOSS OF SENSATION IN FEET: 0
NEUROLOGICAL NEGATIVE: 1

## 2023-05-02 NOTE — PROGRESS NOTES
Patient's PCP is Adonis Simms MD      COLD SYMPTOMS  At home Covid-19 test: NO   5 Day Symptoms:  Runny nose, Congestion, Post Nasal Drainage, Headache, Sinus pressure on forehead and under eyes, Dry and Productive Cough---coughing fits , BILATERAL  Ear ache pressure  Sore throat,   Length of Symptoms: Monday April 17th 2023   Denies:  Fever,SOB, Wheezing, Nausea, Diarrhea, Vomiting, Chills, Body aches, Sneezing, Fatigue,    Factors that effect symptoms: Sudafed     --Related Information--    Last seen by [facility] for sinusitis on 04.01.2023  Given 875 mg Amoxicillin Potassium   --------------------------------

## 2023-05-02 NOTE — PROGRESS NOTES
docSubjective   Patient ID: Kavya Anthony is a 78 y.o. female who presents for Nasal Congestion and Earache.  Patient presents to office with complaint of sinus pressure, congestion, cough x 10 days. Patient was seen one month ago and had resolved symptoms.  Patient denies shortness of breath, chest pain, fever, nausea, vomiting nor diarrhea.     Earache   There is pain in both ears. This is a new problem. The current episode started 1 to 4 weeks ago. The problem occurs constantly. The problem has been unchanged. There has been no fever. Associated symptoms include coughing and rhinorrhea. Pertinent negatives include no abdominal pain, diarrhea, ear discharge, headaches, hearing loss, neck pain, rash, sore throat or vomiting.       Review of Systems   Constitutional: Negative.    HENT:  Positive for ear pain and rhinorrhea. Negative for ear discharge, hearing loss and sore throat.    Eyes: Negative.    Respiratory:  Positive for cough. Negative for chest tightness, shortness of breath and wheezing.    Cardiovascular: Negative.  Negative for chest pain.   Gastrointestinal: Negative.  Negative for abdominal pain, diarrhea and vomiting.   Endocrine: Negative.    Genitourinary: Negative.    Musculoskeletal: Negative.  Negative for neck pain.   Skin: Negative.  Negative for rash.   Allergic/Immunologic: Negative.    Neurological: Negative.  Negative for headaches.   Hematological: Negative.    Psychiatric/Behavioral: Negative.     All other systems reviewed and are negative.      /84   Pulse 72   Temp 36.2 °C (97.2 °F)   Resp 16   Wt 90.3 kg (199 lb)   SpO2 98%   BMI 31.17 kg/m²     Objective   Physical Exam  Vitals and nursing note reviewed.   Constitutional:       Appearance: Normal appearance.   HENT:      Head: Normocephalic and atraumatic.      Right Ear: Tympanic membrane, ear canal and external ear normal.      Left Ear: Tympanic membrane, ear canal and external ear normal.      Nose: Congestion  present.      Comments: Moderate erythema noted bilateral nares.  And maxillary sinus pressure, tender to palpation.     Mouth/Throat:      Mouth: Mucous membranes are moist.      Pharynx: Oropharynx is clear.   Eyes:      Extraocular Movements: Extraocular movements intact.      Conjunctiva/sclera: Conjunctivae normal.      Pupils: Pupils are equal, round, and reactive to light.   Cardiovascular:      Rate and Rhythm: Normal rate and regular rhythm.      Pulses: Normal pulses.      Heart sounds: Normal heart sounds.   Pulmonary:      Effort: Pulmonary effort is normal.      Breath sounds: No wheezing, rhonchi or rales.   Abdominal:      General: Bowel sounds are normal.      Palpations: Abdomen is soft.   Musculoskeletal:         General: Normal range of motion.      Cervical back: Normal range of motion and neck supple.   Skin:     General: Skin is warm and dry.      Capillary Refill: Capillary refill takes less than 2 seconds.   Neurological:      General: No focal deficit present.      Mental Status: She is alert and oriented to person, place, and time.   Psychiatric:         Mood and Affect: Mood normal.         Behavior: Behavior normal.         Thought Content: Thought content normal.         Judgment: Judgment normal.         Assessment/Plan   Problem List Items Addressed This Visit    None

## 2023-05-02 NOTE — ASSESSMENT & PLAN NOTE
Patient to take medications associated with this visit.  Patient may also take OTC analgesic/antipyretic if needed for pain/fever.  Advised to increase oral fluid intake as tolerated.  Patient educated and advised to use humidifier for symptoms.

## 2023-05-15 NOTE — PROGRESS NOTES
Subjective   Patient ID:  Kavya Anthony is a 78 y.o. female patient who presents today for Diabetes, Hypothyroidism, GERD, Arthritis, and Back Pain.    Past Medical, Surgical, and Family History reviewed and updated in chart.     Reviewed all medications by prescribing practitioner or clinical pharmacist (such as prescriptions, OTCs, herbal therapies and supplements) and documented in the medical record.     Diabetes Mellitus: The patient presents today for a follow up of DM. Patient denies any side effects to the medications.      Hypothyroidism: Patient presents for a follow up of their thyroid function. Energy wise that patient is well. The patient is compliant with their medications, which are currently levothyroxine (Synthroid). Patient denies any side effects to the medications.     Arthritis: Kavya Anthony is an 78 y.o. female who presents with arthralgias. Pain is located in multiple joints. The patient has not been hospitalized for this in the last 6 months. The patient is compliant with medications. Patient denies any side effects to the medications.     GERD: The patient presents today for a follow up of GERD. The patient states that they Yes previous hx of abdominal pain. Pain is typically alleviated by the use of Protonix.     Back Pain: Patient is presenting today for a follow up of back pain. She reports that the pain is controlled with the current medication regimen. She state that the pain does not radiate to lower extremities. Patient voices that their activity levels have returned to normal. She reports using any OTC medications for additional treatment: Yes.      Edema: The patient is presenting today with reports of edema in both lower legs. The patient is compliant with medications. Patient denies any side effects to the medications.      Morbid Obesity: Kavya Anthony is a 78 y.o. female is here for follow up of morbid obesity.  The patient is continuously working on diet and exercise. The  "patient will continue working on strategies to maintain weight loss and stay well hydrated.     The patient denies having the following symptoms: chest pain, chest pressure, fever, chills, N/V/D, constipation, dizziness, headaches, SOB.       Patient Care Team:  Adonis Simms MD as PCP - General      Objective   Vitals:  /72   Pulse 68   Temp 36.6 °C (97.8 °F)   Resp 16   Ht 1.702 m (5' 7\")   Wt 91.1 kg (200 lb 12.8 oz)   SpO2 98%   BMI 31.45 kg/m²     Physical Exam  Vitals reviewed.   Constitutional:       Appearance: Normal appearance. She is obese.      Comments: Patient ambulates with a cane daily.   HENT:      Right Ear: Tympanic membrane and ear canal normal.      Left Ear: Tympanic membrane and ear canal normal.      Mouth/Throat:      Pharynx: Oropharynx is clear.   Eyes:      Extraocular Movements: Extraocular movements intact.      Conjunctiva/sclera: Conjunctivae normal.      Pupils: Pupils are equal, round, and reactive to light.   Cardiovascular:      Rate and Rhythm: Normal rate and regular rhythm.      Heart sounds: Normal heart sounds.   Pulmonary:      Effort: Pulmonary effort is normal. No respiratory distress.      Breath sounds: Normal breath sounds.   Abdominal:      General: There is no distension.      Palpations: There is no mass.      Tenderness: There is no abdominal tenderness. There is no guarding or rebound.      Hernia: No hernia is present.   Musculoskeletal:      Cervical back: Neck supple.   Skin:     General: Skin is warm and dry.   Neurological:      Mental Status: She is alert and oriented to person, place, and time.       Labs pending.    Assessment/Plan   Problem List Items Addressed This Visit          Nervous    Type 2 diabetes mellitus with diabetic polyneuropathy, without long-term current use of insulin (CMS/[facility]) - Primary    Current Assessment & Plan     Is clinically stable so we will continue with current medications and lab work to confirm status " ordered.                Digestive    GERD without esophagitis    Current Assessment & Plan      Is well controlled, continue with current medications.              Musculoskeletal    CMC arthritis    Current Assessment & Plan      Is stable, continue with current treatment.          Peripheral edema    Current Assessment & Plan      Is well controlled, continue with current medications.              Endocrine/Metabolic    Hypothyroidism    Current Assessment & Plan     Is clinically stable so we will continue with current medications and lab work to confirm status ordered.           Vitamin B 12 deficiency    Current Assessment & Plan     Is clinically stable so we will continue with current medications and lab work to confirm status ordered.           Morbid obesity (CMS/[facility])    Current Assessment & Plan      This condition is poorly controlled, therapeutic changes necessary.  The patient is continuously working on diet and exercise. The patient will continue working on strategies to maintain weight loss and stay well hydrated.               Other    Generalized anxiety disorder    Current Assessment & Plan      Is stable, continue with current treatment.             Follow up in: 6 months with labs today.    Scribe Attestation  By signing my name below, IMey Scribe   attest that this documentation has been prepared under the direction and in the presence of Adonis Simms MD.

## 2023-05-16 ENCOUNTER — OFFICE VISIT (OUTPATIENT)
Dept: PRIMARY CARE | Facility: CLINIC | Age: 78
End: 2023-05-16
Payer: MEDICARE

## 2023-05-16 VITALS
HEART RATE: 68 BPM | BODY MASS INDEX: 31.52 KG/M2 | TEMPERATURE: 97.8 F | SYSTOLIC BLOOD PRESSURE: 120 MMHG | OXYGEN SATURATION: 98 % | WEIGHT: 200.8 LBS | RESPIRATION RATE: 16 BRPM | DIASTOLIC BLOOD PRESSURE: 72 MMHG | HEIGHT: 67 IN

## 2023-05-16 DIAGNOSIS — E66.01 MORBID OBESITY (MULTI): ICD-10-CM

## 2023-05-16 DIAGNOSIS — E03.9 ACQUIRED HYPOTHYROIDISM: ICD-10-CM

## 2023-05-16 DIAGNOSIS — I47.10 SUPRAVENTRICULAR TACHYCARDIA (CMS-HCC): ICD-10-CM

## 2023-05-16 DIAGNOSIS — K21.9 GERD WITHOUT ESOPHAGITIS: ICD-10-CM

## 2023-05-16 DIAGNOSIS — F41.1 GENERALIZED ANXIETY DISORDER: ICD-10-CM

## 2023-05-16 DIAGNOSIS — R60.9 PERIPHERAL EDEMA: ICD-10-CM

## 2023-05-16 DIAGNOSIS — C50.912 INFILTRATING DUCTAL CARCINOMA OF LEFT FEMALE BREAST (MULTI): ICD-10-CM

## 2023-05-16 DIAGNOSIS — E11.42 TYPE 2 DIABETES MELLITUS WITH DIABETIC POLYNEUROPATHY, WITHOUT LONG-TERM CURRENT USE OF INSULIN (MULTI): Primary | ICD-10-CM

## 2023-05-16 DIAGNOSIS — Z51.81 ENCOUNTER FOR THERAPEUTIC DRUG LEVEL MONITORING: ICD-10-CM

## 2023-05-16 DIAGNOSIS — M19.049 CMC ARTHRITIS: ICD-10-CM

## 2023-05-16 DIAGNOSIS — E53.8 VITAMIN B 12 DEFICIENCY: ICD-10-CM

## 2023-05-16 PROCEDURE — 1159F MED LIST DOCD IN RCRD: CPT | Performed by: FAMILY MEDICINE

## 2023-05-16 PROCEDURE — 1036F TOBACCO NON-USER: CPT | Performed by: FAMILY MEDICINE

## 2023-05-16 PROCEDURE — 80365 DRUG SCREENING OXYCODONE: CPT

## 2023-05-16 PROCEDURE — 80346 BENZODIAZEPINES1-12: CPT

## 2023-05-16 PROCEDURE — 80307 DRUG TEST PRSMV CHEM ANLYZR: CPT

## 2023-05-16 PROCEDURE — 1160F RVW MEDS BY RX/DR IN RCRD: CPT | Performed by: FAMILY MEDICINE

## 2023-05-16 PROCEDURE — 80358 DRUG SCREENING METHADONE: CPT

## 2023-05-16 PROCEDURE — 99214 OFFICE O/P EST MOD 30 MIN: CPT | Performed by: FAMILY MEDICINE

## 2023-05-16 PROCEDURE — 80368 SEDATIVE HYPNOTICS: CPT

## 2023-05-16 PROCEDURE — 80354 DRUG SCREENING FENTANYL: CPT

## 2023-05-16 PROCEDURE — 80373 DRUG SCREENING TRAMADOL: CPT

## 2023-05-16 PROCEDURE — 80361 OPIATES 1 OR MORE: CPT

## 2023-05-16 NOTE — ASSESSMENT & PLAN NOTE
This condition is poorly controlled, therapeutic changes necessary.  The patient is continuously working on diet and exercise. The patient will continue working on strategies to maintain weight loss and stay well hydrated.

## 2023-05-16 NOTE — ASSESSMENT & PLAN NOTE
Is clinically stable so we will continue with current medications and lab work to confirm status ordered.       Implemented All Universal Safety Interventions:  [place] to call system. Call bell, personal items and telephone within reach. Instruct patient to call for assistance. Room bathroom lighting operational. Non-slip footwear when patient is off stretcher. Physically safe environment: no spills, clutter or unnecessary equipment. Stretcher in lowest position, wheels locked, appropriate side rails in place.

## 2023-05-17 ENCOUNTER — LAB (OUTPATIENT)
Dept: LAB | Facility: LAB | Age: 78
End: 2023-05-17
Payer: MEDICARE

## 2023-05-17 DIAGNOSIS — E11.42 TYPE 2 DIABETES MELLITUS WITH DIABETIC POLYNEUROPATHY, WITHOUT LONG-TERM CURRENT USE OF INSULIN (MULTI): ICD-10-CM

## 2023-05-17 DIAGNOSIS — E03.9 ACQUIRED HYPOTHYROIDISM: ICD-10-CM

## 2023-05-17 DIAGNOSIS — E53.8 VITAMIN B 12 DEFICIENCY: ICD-10-CM

## 2023-05-17 LAB
ALANINE AMINOTRANSFERASE (SGPT) (U/L) IN SER/PLAS: 11 U/L (ref 7–45)
ALBUMIN (G/DL) IN SER/PLAS: 4.1 G/DL (ref 3.4–5)
ALKALINE PHOSPHATASE (U/L) IN SER/PLAS: 85 U/L (ref 33–136)
ANION GAP IN SER/PLAS: 12 MMOL/L (ref 10–20)
ASPARTATE AMINOTRANSFERASE (SGOT) (U/L) IN SER/PLAS: 16 U/L (ref 9–39)
BASOPHILS (10*3/UL) IN BLOOD BY AUTOMATED COUNT: 0.04 X10E9/L (ref 0–0.1)
BASOPHILS/100 LEUKOCYTES IN BLOOD BY AUTOMATED COUNT: 0.6 % (ref 0–2)
BILIRUBIN TOTAL (MG/DL) IN SER/PLAS: 0.4 MG/DL (ref 0–1.2)
CALCIUM (MG/DL) IN SER/PLAS: 9.1 MG/DL (ref 8.6–10.3)
CARBON DIOXIDE, TOTAL (MMOL/L) IN SER/PLAS: 29 MMOL/L (ref 21–32)
CHLORIDE (MMOL/L) IN SER/PLAS: 105 MMOL/L (ref 98–107)
CHOLESTEROL (MG/DL) IN SER/PLAS: 196 MG/DL (ref 0–199)
CHOLESTEROL IN HDL (MG/DL) IN SER/PLAS: 49.2 MG/DL
CHOLESTEROL/HDL RATIO: 4
COBALAMIN (VITAMIN B12) (PG/ML) IN SER/PLAS: 1842 PG/ML (ref 211–911)
CREATININE (MG/DL) IN SER/PLAS: 0.76 MG/DL (ref 0.5–1.05)
EOSINOPHILS (10*3/UL) IN BLOOD BY AUTOMATED COUNT: 0.17 X10E9/L (ref 0–0.4)
EOSINOPHILS/100 LEUKOCYTES IN BLOOD BY AUTOMATED COUNT: 2.6 % (ref 0–6)
ERYTHROCYTE DISTRIBUTION WIDTH (RATIO) BY AUTOMATED COUNT: 15 % (ref 11.5–14.5)
ERYTHROCYTE MEAN CORPUSCULAR HEMOGLOBIN CONCENTRATION (G/DL) BY AUTOMATED: 32 G/DL (ref 32–36)
ERYTHROCYTE MEAN CORPUSCULAR VOLUME (FL) BY AUTOMATED COUNT: 83 FL (ref 80–100)
ERYTHROCYTES (10*6/UL) IN BLOOD BY AUTOMATED COUNT: 4.5 X10E12/L (ref 4–5.2)
ESTIMATED AVERAGE GLUCOSE FOR HBA1C: 123 MG/DL
GFR FEMALE: 80 ML/MIN/1.73M2
GLUCOSE (MG/DL) IN SER/PLAS: 122 MG/DL (ref 74–99)
HEMATOCRIT (%) IN BLOOD BY AUTOMATED COUNT: 37.5 % (ref 36–46)
HEMOGLOBIN (G/DL) IN BLOOD: 12 G/DL (ref 12–16)
HEMOGLOBIN A1C/HEMOGLOBIN TOTAL IN BLOOD: 5.9 %
IMMATURE GRANULOCYTES/100 LEUKOCYTES IN BLOOD BY AUTOMATED COUNT: 0.3 % (ref 0–0.9)
LDL: 124 MG/DL (ref 0–99)
LEUKOCYTES (10*3/UL) IN BLOOD BY AUTOMATED COUNT: 6.4 X10E9/L (ref 4.4–11.3)
LYMPHOCYTES (10*3/UL) IN BLOOD BY AUTOMATED COUNT: 2.36 X10E9/L (ref 0.8–3)
LYMPHOCYTES/100 LEUKOCYTES IN BLOOD BY AUTOMATED COUNT: 36.8 % (ref 13–44)
MONOCYTES (10*3/UL) IN BLOOD BY AUTOMATED COUNT: 0.74 X10E9/L (ref 0.05–0.8)
MONOCYTES/100 LEUKOCYTES IN BLOOD BY AUTOMATED COUNT: 11.5 % (ref 2–10)
NEUTROPHILS (10*3/UL) IN BLOOD BY AUTOMATED COUNT: 3.09 X10E9/L (ref 1.6–5.5)
NEUTROPHILS/100 LEUKOCYTES IN BLOOD BY AUTOMATED COUNT: 48.2 % (ref 40–80)
PLATELETS (10*3/UL) IN BLOOD AUTOMATED COUNT: 241 X10E9/L (ref 150–450)
POTASSIUM (MMOL/L) IN SER/PLAS: 4.3 MMOL/L (ref 3.5–5.3)
PROTEIN TOTAL: 6.4 G/DL (ref 6.4–8.2)
SODIUM (MMOL/L) IN SER/PLAS: 142 MMOL/L (ref 136–145)
THYROTROPIN (MIU/L) IN SER/PLAS BY DETECTION LIMIT <= 0.05 MIU/L: 2.25 MIU/L (ref 0.44–3.98)
TRIGLYCERIDE (MG/DL) IN SER/PLAS: 115 MG/DL (ref 0–149)
UREA NITROGEN (MG/DL) IN SER/PLAS: 17 MG/DL (ref 6–23)
VLDL: 23 MG/DL (ref 0–40)

## 2023-05-17 PROCEDURE — 85025 COMPLETE CBC W/AUTO DIFF WBC: CPT

## 2023-05-17 PROCEDURE — 36415 COLL VENOUS BLD VENIPUNCTURE: CPT

## 2023-05-17 PROCEDURE — 80053 COMPREHEN METABOLIC PANEL: CPT

## 2023-05-17 PROCEDURE — 83036 HEMOGLOBIN GLYCOSYLATED A1C: CPT

## 2023-05-17 PROCEDURE — 80061 LIPID PANEL: CPT

## 2023-05-17 PROCEDURE — 84443 ASSAY THYROID STIM HORMONE: CPT

## 2023-05-17 PROCEDURE — 82607 VITAMIN B-12: CPT

## 2023-05-19 LAB
6-ACETYLMORPHINE: <25 NG/ML
7-AMINOCLONAZEPAM: <25 NG/ML
ALPHA-HYDROXYALPRAZOLAM: <25 NG/ML
ALPHA-HYDROXYMIDAZOLAM: <25 NG/ML
ALPRAZOLAM: <25 NG/ML
AMPHETAMINE (PRESENCE) IN URINE BY SCREEN METHOD: NORMAL
BARBITURATES PRESENCE IN URINE BY SCREEN METHOD: NORMAL
CANNABINOIDS IN URINE BY SCREEN METHOD: NORMAL
CHLORDIAZEPOXIDE: <25 NG/ML
CLONAZEPAM: <25 NG/ML
COCAINE (PRESENCE) IN URINE BY SCREEN METHOD: NORMAL
CODEINE: <50 NG/ML
CREATINE, URINE FOR DRUG: 72.8 MG/DL
DIAZEPAM: <25 NG/ML
DRUG SCREEN COMMENT URINE: NORMAL
EDDP: <25 NG/ML
FENTANYL CONFIRMATION, URINE: <2.5 NG/ML
HYDROCODONE: <25 NG/ML
HYDROMORPHONE: <25 NG/ML
LORAZEPAM: <25 NG/ML
METHADONE CONFIRMATION,URINE: <25 NG/ML
MIDAZOLAM: <25 NG/ML
MORPHINE URINE: <50 NG/ML
NORDIAZEPAM: <25 NG/ML
NORFENTANYL: <2.5 NG/ML
NORHYDROCODONE: <25 NG/ML
NOROXYCODONE: <25 NG/ML
O-DESMETHYLTRAMADOL: <50 NG/ML
OXAZEPAM: <25 NG/ML
OXYCODONE: <25 NG/ML
OXYMORPHONE: <25 NG/ML
PHENCYCLIDINE (PRESENCE) IN URINE BY SCREEN METHOD: NORMAL
TEMAZEPAM: <25 NG/ML
TRAMADOL: <50 NG/ML
ZOLPIDEM METABOLITE (ZCA): <25 NG/ML
ZOLPIDEM: <25 NG/ML

## 2023-06-25 NOTE — TELEPHONE ENCOUNTER
[facility] Occupational Therapy    Patient not seen in therapy.     Re-attempt plan: per established plan of care    Writer spoke with pt and son via phone on Friday and set up family training for today at 0830 however son not present at scheduled time, still not present at 1030. See PT note for details. Will continue therapy efforts as able.                                 Patient aware of antibiotic sent in by Violeta.

## 2023-07-03 DIAGNOSIS — M54.12 C6 RADICULOPATHY: ICD-10-CM

## 2023-07-03 RX ORDER — PREGABALIN 150 MG/1
150 CAPSULE ORAL 2 TIMES DAILY
Qty: 20 CAPSULE | Refills: 0 | Status: SHIPPED | OUTPATIENT
Start: 2023-07-03 | End: 2023-07-13

## 2023-07-03 NOTE — TELEPHONE ENCOUNTER
Rx Refill Request Telephone Encounter    Name:  Kavya Anthony  :  [id number]  Medication Name:  lyrica 150 mg ; last fill   Specific Pharmacy location:  [facility]  Date of last appointment:    Date of next appointment:    Best number to reach patient:  [phone]    Please advise.

## 2023-07-03 NOTE — TELEPHONE ENCOUNTER
Please advise pended medication for a Dr. Simms patient.      Medication has been pended to provider for approval.     LV: 5/16/2023   NV:  11/16/2023

## 2023-11-16 ENCOUNTER — APPOINTMENT (OUTPATIENT)
Dept: PRIMARY CARE | Facility: CLINIC | Age: 78
End: 2023-11-16
Payer: MEDICARE

## 2023-12-13 ENCOUNTER — HOSPITAL ENCOUNTER (EMERGENCY)
Facility: HOSPITAL | Age: 78
Discharge: HOME | End: 2023-12-13
Attending: EMERGENCY MEDICINE
Payer: MEDICARE

## 2023-12-13 ENCOUNTER — APPOINTMENT (OUTPATIENT)
Dept: RADIOLOGY | Facility: HOSPITAL | Age: 78
End: 2023-12-13
Payer: MEDICARE

## 2023-12-13 VITALS
HEART RATE: 75 BPM | SYSTOLIC BLOOD PRESSURE: 184 MMHG | BODY MASS INDEX: 31.08 KG/M2 | WEIGHT: 198 LBS | RESPIRATION RATE: 18 BRPM | HEIGHT: 67 IN | OXYGEN SATURATION: 99 % | TEMPERATURE: 98.2 F | DIASTOLIC BLOOD PRESSURE: 93 MMHG

## 2023-12-13 DIAGNOSIS — J02.9 EXUDATIVE PHARYNGITIS: Primary | ICD-10-CM

## 2023-12-13 LAB
ANION GAP SERPL CALC-SCNC: 12 MMOL/L (ref 10–20)
BASOPHILS # BLD AUTO: 0.03 X10*3/UL (ref 0–0.1)
BASOPHILS NFR BLD AUTO: 0.3 %
BUN SERPL-MCNC: 13 MG/DL (ref 6–23)
CALCIUM SERPL-MCNC: 9.5 MG/DL (ref 8.6–10.3)
CHLORIDE SERPL-SCNC: 108 MMOL/L (ref 98–107)
CO2 SERPL-SCNC: 25 MMOL/L (ref 21–32)
CREAT SERPL-MCNC: 0.66 MG/DL (ref 0.5–1.05)
EOSINOPHIL # BLD AUTO: 0.17 X10*3/UL (ref 0–0.4)
EOSINOPHIL NFR BLD AUTO: 1.6 %
ERYTHROCYTE [DISTWIDTH] IN BLOOD BY AUTOMATED COUNT: 14.3 % (ref 11.5–14.5)
GFR SERPL CREATININE-BSD FRML MDRD: 90 ML/MIN/1.73M*2
GLUCOSE SERPL-MCNC: 141 MG/DL (ref 74–99)
HCT VFR BLD AUTO: 37.5 % (ref 36–46)
HGB BLD-MCNC: 12.6 G/DL (ref 12–16)
IMM GRANULOCYTES # BLD AUTO: 0.03 X10*3/UL (ref 0–0.5)
IMM GRANULOCYTES NFR BLD AUTO: 0.3 % (ref 0–0.9)
LYMPHOCYTES # BLD AUTO: 1.62 X10*3/UL (ref 0.8–3)
LYMPHOCYTES NFR BLD AUTO: 15.1 %
MCH RBC QN AUTO: 27.2 PG (ref 26–34)
MCHC RBC AUTO-ENTMCNC: 33.6 G/DL (ref 32–36)
MCV RBC AUTO: 81 FL (ref 80–100)
MONOCYTES # BLD AUTO: 0.98 X10*3/UL (ref 0.05–0.8)
MONOCYTES NFR BLD AUTO: 9.1 %
NEUTROPHILS # BLD AUTO: 7.9 X10*3/UL (ref 1.6–5.5)
NEUTROPHILS NFR BLD AUTO: 73.6 %
NRBC BLD-RTO: 0 /100 WBCS (ref 0–0)
PLATELET # BLD AUTO: 233 X10*3/UL (ref 150–450)
POTASSIUM SERPL-SCNC: 3.6 MMOL/L (ref 3.5–5.3)
RBC # BLD AUTO: 4.63 X10*6/UL (ref 4–5.2)
S PYO DNA THROAT QL NAA+PROBE: NOT DETECTED
SARS-COV-2 RNA RESP QL NAA+PROBE: NOT DETECTED
SODIUM SERPL-SCNC: 141 MMOL/L (ref 136–145)
WBC # BLD AUTO: 10.7 X10*3/UL (ref 4.4–11.3)

## 2023-12-13 PROCEDURE — 80048 BASIC METABOLIC PNL TOTAL CA: CPT | Performed by: EMERGENCY MEDICINE

## 2023-12-13 PROCEDURE — 96374 THER/PROPH/DIAG INJ IV PUSH: CPT | Mod: 59

## 2023-12-13 PROCEDURE — 70491 CT SOFT TISSUE NECK W/DYE: CPT

## 2023-12-13 PROCEDURE — 70491 CT SOFT TISSUE NECK W/DYE: CPT | Performed by: STUDENT IN AN ORGANIZED HEALTH CARE EDUCATION/TRAINING PROGRAM

## 2023-12-13 PROCEDURE — 96361 HYDRATE IV INFUSION ADD-ON: CPT

## 2023-12-13 PROCEDURE — 99284 EMERGENCY DEPT VISIT MOD MDM: CPT | Mod: 25 | Performed by: EMERGENCY MEDICINE

## 2023-12-13 PROCEDURE — 87635 SARS-COV-2 COVID-19 AMP PRB: CPT | Performed by: EMERGENCY MEDICINE

## 2023-12-13 PROCEDURE — 2550000001 HC RX 255 CONTRASTS: Performed by: EMERGENCY MEDICINE

## 2023-12-13 PROCEDURE — 85025 COMPLETE CBC W/AUTO DIFF WBC: CPT | Performed by: EMERGENCY MEDICINE

## 2023-12-13 PROCEDURE — 36415 COLL VENOUS BLD VENIPUNCTURE: CPT | Performed by: EMERGENCY MEDICINE

## 2023-12-13 PROCEDURE — 87651 STREP A DNA AMP PROBE: CPT | Performed by: EMERGENCY MEDICINE

## 2023-12-13 PROCEDURE — 2500000004 HC RX 250 GENERAL PHARMACY W/ HCPCS (ALT 636 FOR OP/ED): Performed by: EMERGENCY MEDICINE

## 2023-12-13 RX ORDER — DEXAMETHASONE SODIUM PHOSPHATE 10 MG/ML
10 INJECTION INTRAMUSCULAR; INTRAVENOUS ONCE
Status: COMPLETED | OUTPATIENT
Start: 2023-12-13 | End: 2023-12-13

## 2023-12-13 RX ORDER — METHYLPREDNISOLONE 4 MG/1
TABLET ORAL
Qty: 21 TABLET | Refills: 0 | Status: SHIPPED | OUTPATIENT
Start: 2023-12-13 | End: 2023-12-20

## 2023-12-13 RX ORDER — AMOXICILLIN AND CLAVULANATE POTASSIUM 875; 125 MG/1; MG/1
1 TABLET, FILM COATED ORAL EVERY 12 HOURS
Qty: 14 TABLET | Refills: 0 | Status: SHIPPED | OUTPATIENT
Start: 2023-12-13 | End: 2023-12-20

## 2023-12-13 RX ADMIN — SODIUM CHLORIDE 500 ML: 9 INJECTION, SOLUTION INTRAVENOUS at 05:41

## 2023-12-13 RX ADMIN — DEXAMETHASONE SODIUM PHOSPHATE 10 MG: 10 INJECTION, SOLUTION INTRAMUSCULAR; INTRAVENOUS at 05:41

## 2023-12-13 RX ADMIN — IOHEXOL 75 ML: 350 INJECTION, SOLUTION INTRAVENOUS at 06:10

## 2023-12-13 ASSESSMENT — PAIN DESCRIPTION - LOCATION: LOCATION: THROAT

## 2023-12-13 ASSESSMENT — COLUMBIA-SUICIDE SEVERITY RATING SCALE - C-SSRS
1. IN THE PAST MONTH, HAVE YOU WISHED YOU WERE DEAD OR WISHED YOU COULD GO TO SLEEP AND NOT WAKE UP?: NO
6. HAVE YOU EVER DONE ANYTHING, STARTED TO DO ANYTHING, OR PREPARED TO DO ANYTHING TO END YOUR LIFE?: NO
2. HAVE YOU ACTUALLY HAD ANY THOUGHTS OF KILLING YOURSELF?: NO

## 2023-12-13 ASSESSMENT — LIFESTYLE VARIABLES
EVER HAD A DRINK FIRST THING IN THE MORNING TO STEADY YOUR NERVES TO GET RID OF A HANGOVER: NO
HAVE YOU EVER FELT YOU SHOULD CUT DOWN ON YOUR DRINKING: NO
HAVE PEOPLE ANNOYED YOU BY CRITICIZING YOUR DRINKING: NO
REASON UNABLE TO ASSESS: NO
EVER FELT BAD OR GUILTY ABOUT YOUR DRINKING: NO

## 2023-12-13 ASSESSMENT — PAIN - FUNCTIONAL ASSESSMENT: PAIN_FUNCTIONAL_ASSESSMENT: 0-10

## 2023-12-13 ASSESSMENT — PAIN SCALES - GENERAL: PAINLEVEL_OUTOF10: 4

## 2023-12-13 NOTE — ED PROVIDER NOTES
"HPI   Chief Complaint   Patient presents with    Sore Throat     \"I feel like my voice keeps going and like maybe my tonsils are swollen and my throat is sore. I feel like my throat is closing.\" 99% RA with no signs of distress       Chief complaint sore throat   history of present illness 78-year-old  female who had a sore throat for 1 week feels as if it is closing up on her.  Has not sought any medical attention today is lost her voice.  No stridor no shortness of breath no chest pain and is here for assessment                          No data recorded                Patient History   Past Medical History:   Diagnosis Date    Anesthesia of skin 03/30/2021    Numbness and tingling    Disease of digestive system, unspecified 03/30/2021    Digestive problems    History of artificial lens replacement 05/04/2015    Other general symptoms and signs 03/30/2021    Eye, ear, nose, and throat symptom    Other specified disorders of breast 10/21/2020    Acquired breast deformity    Perianal venous thrombosis 10/03/2019    Thrombosed external hemorrhoids    Personal history of malignant neoplasm, unspecified 03/30/2021    History of malignant neoplasm    Personal history of other diseases of the digestive system     History of hemorrhoids    Personal history of other diseases of urinary system     History of hematuria    Personal history of other endocrine, nutritional and metabolic disease 03/30/2021    History of diabetes mellitus    Personal history of other specified conditions 06/19/2021    History of urinary frequency    Personal history of other specified conditions 06/19/2021    History of dysuria    Personal history of other specified conditions 06/19/2021    History of urinary urgency    Personal history of other specified conditions 03/30/2021    History of balance disorder    Personal history of other specified conditions 12/26/2019    History of vertigo    Personal history of urinary (tract) infections  "    History of chronic urinary tract infection    Personal history of urinary (tract) infections 06/04/2021    History of urinary tract infection    Presence of unspecified artificial knee joint     History of knee replacement    Radial styloid tenosynovitis (de quervain) 12/02/2021    Radial styloid tenosynovitis [de quervain]    Sensorineural hearing loss, bilateral 02/10/2021    Bilateral sensorineural hearing loss    Unspecified visual loss 03/30/2021    Vision problems     Past Surgical History:   Procedure Laterality Date    OTHER SURGICAL HISTORY  08/22/2019    Knee replacement    OTHER SURGICAL HISTORY  08/31/2020    Cataract surgery    OTHER SURGICAL HISTORY  08/31/2020    Breast biopsy    OTHER SURGICAL HISTORY  12/12/2019    Cholecystectomy    OTHER SURGICAL HISTORY  12/12/2019    Appendectomy    OTHER SURGICAL HISTORY  12/12/2019    Total hysterectomy with removal of both tubes and ovaries     No family history on file.  Social History     Tobacco Use    Smoking status: Never     Passive exposure: Never    Smokeless tobacco: Never   Substance Use Topics    Alcohol use: Never    Drug use: Never       Physical Exam   ED Triage Vitals [12/13/23 0459]   Temp Heart Rate Resp BP   36.8 °C (98.2 °F) 76 17 (!) 206/99      SpO2 Temp Source Heart Rate Source Patient Position   99 % Temporal Monitor Sitting      BP Location FiO2 (%)     Right arm --       Physical Exam  Vitals reviewed.   Constitutional:       Appearance: She is well-developed.   HENT:      Head: Normocephalic and atraumatic.      Mouth/Throat:      Mouth: Mucous membranes are moist.      Pharynx: Uvula midline. Pharyngeal swelling and posterior oropharyngeal erythema present.      Tonsils: 1+ on the right. 1+ on the left.   Abdominal:      Palpations: Abdomen is soft.   Skin:     General: Skin is warm.      Capillary Refill: Capillary refill takes less than 2 seconds.   Neurological:      Mental Status: She is alert.         ED Course & MDM    Diagnoses as of 12/13/23 0634   Exudative pharyngitis       Medical Decision Making  Differential diagnosis for this patient  #1 strep pharyngitis  #2 COVID  #3 uvulitis  #4 peritonsillar abscess  #5 retropharyngeal abscess  #6 epiglottitis  Considering the above differential diagnosis following test and treatments were considered in order with shared decision making  IV fluids IV Decadron CT of the neck with contrast  CBC electrolytes strep test and COVID test    Amount and/or Complexity of Data Reviewed  Labs: ordered. Decision-making details documented in ED Course.     Details: COVID and influenza were negative white count of 10  Radiology: ordered and independent interpretation performed.     Details: CT of the neck negative for adenopathy mass or, peritonsillar or retropharyngeal abscess      Labs Reviewed   CBC WITH AUTO DIFFERENTIAL - Abnormal       Result Value    WBC 10.7      nRBC 0.0      RBC 4.63      Hemoglobin 12.6      Hematocrit 37.5      MCV 81      MCH 27.2      MCHC 33.6      RDW 14.3      Platelets 233      Neutrophils % 73.6      Immature Granulocytes %, Automated 0.3      Lymphocytes % 15.1      Monocytes % 9.1      Eosinophils % 1.6      Basophils % 0.3      Neutrophils Absolute 7.90 (*)     Immature Granulocytes Absolute, Automated 0.03      Lymphocytes Absolute 1.62      Monocytes Absolute 0.98 (*)     Eosinophils Absolute 0.17      Basophils Absolute 0.03     BASIC METABOLIC PANEL - Abnormal    Glucose 141 (*)     Sodium 141      Potassium 3.6      Chloride 108 (*)     Bicarbonate 25      Anion Gap 12      Urea Nitrogen 13      Creatinine 0.66      eGFR 90      Calcium 9.5     GROUP A STREPTOCOCCUS, PCR - Normal    Group A Strep PCR Not Detected     SARS-COV-2 PCR, SYMPTOMATIC - Normal    Coronavirus 2019, PCR Not Detected      Narrative:     This assay has received FDA Emergency Use Authorization (EUA) and is only authorized for the duration of time that circumstances exist to justify  the authorization of the emergency use of in vitro diagnostic tests for the detection of SARS-CoV-2 virus and/or diagnosis of COVID-19 infection under section 564(b)(1) of the Act, 21 U.S.C. 360bbb-3(b)(1). This assay is an in vitro diagnostic nucleic acid amplification test for the qualitative detection of SARS-CoV-2 from nasopharyngeal specimens and has been validated for use at [facility]. Negative results do not preclude COVID-19 infections and should not be used as the sole basis for diagnosis, treatment, or other management decisions.          CT soft tissue neck w IV contrast   Final Result   No evidence of significant cervical adenopathy or a soft tissue mass   in the neck.        No evidence of peritonsillar or retropharyngeal abscess.        Signed by: Simone Finley 12/13/2023 6:25 AM   Dictation workstation:   [id number]           Procedure  Procedures     Tariq Cornelius MD  12/13/23 0634

## 2024-01-10 ENCOUNTER — OFFICE VISIT (OUTPATIENT)
Dept: OTOLARYNGOLOGY | Facility: CLINIC | Age: 79
End: 2024-01-10
Payer: MEDICARE

## 2024-01-10 DIAGNOSIS — R07.0 THROAT PAIN: ICD-10-CM

## 2024-01-10 DIAGNOSIS — H92.01 ACUTE OTALGIA, RIGHT: Primary | ICD-10-CM

## 2024-01-10 PROCEDURE — 99213 OFFICE O/P EST LOW 20 MIN: CPT | Performed by: OTOLARYNGOLOGY

## 2024-01-10 PROCEDURE — 1036F TOBACCO NON-USER: CPT | Performed by: OTOLARYNGOLOGY

## 2024-01-10 PROCEDURE — 1126F AMNT PAIN NOTED NONE PRSNT: CPT | Performed by: OTOLARYNGOLOGY

## 2024-01-10 RX ORDER — PREDNISONE 20 MG/1
TABLET ORAL
Qty: 9 TABLET | Refills: 0 | Status: SHIPPED | OUTPATIENT
Start: 2024-01-10

## 2024-01-10 NOTE — PROGRESS NOTES
The patient returns.  Seeing her today for evident right ear and throat pain.  She denies any distinct change in swallow.  No change in voice grossly.  These are intermittent symptoms.  She has not been on antibiotics until given a course of Augmentin from urgent care just recently.  Currently on that accordingly.  Remaining head neck inquiry clear.  No other change in past medical surgical history.    Physical exam:  No acute distress.  The external ear structures appear normal. The ear canals patent and the tympanic membranes are intact without evidence of air-fluid levels, retraction, or congenital defects.  Anterior rhinoscopy notes essentially a midline nasal septum. Examination is noted for normal healthy mucosal membranes without any evidence of lesions, polyps, or exudate. The tongue is normally mobile. There are no lesions on the gingiva, buccal, or oral mucosa. There are no oral cavity masses.  The neck is negative for mass lymphadenopathy. The trachea and parotid are clear. The thyroid bed is grossly unremarkable. The salivary gland structures are grossly unremarkable. The laryngeal structures are noted for grossly normal appearance. The true vocal cords, the false focal cords, and the remaining structures including the epiglottis, piriform sinuses, and base of tongue are all grossly normal. There is no evidence of gross mass nodule, polyp, tumor or other defect.      Assessment and plan:  Right otalgia with associated throat pain.  Thankfully exam is clear.  We will go ahead and have her complete current course of Augmentin and additionally put on a prednisone 7-day taper.  We will see how she fares in this regard and she will update me accordingly.  All questions were answered in this regard accordingly.

## 2024-01-25 ENCOUNTER — APPOINTMENT (OUTPATIENT)
Dept: OTOLARYNGOLOGY | Facility: CLINIC | Age: 79
End: 2024-01-25
Payer: MEDICARE

## 2024-08-23 ENCOUNTER — HOSPITAL ENCOUNTER (OUTPATIENT)
Dept: RADIOLOGY | Facility: HOSPITAL | Age: 79
Discharge: HOME | End: 2024-08-23
Payer: MEDICARE

## 2024-08-23 VITALS — HEIGHT: 67 IN | BODY MASS INDEX: 28.25 KG/M2 | WEIGHT: 180 LBS

## 2024-08-23 DIAGNOSIS — Z12.31 ENCOUNTER FOR SCREENING MAMMOGRAM FOR MALIGNANT NEOPLASM OF BREAST: ICD-10-CM

## 2024-08-23 PROCEDURE — 77067 SCR MAMMO BI INCL CAD: CPT | Performed by: RADIOLOGY

## 2024-08-23 PROCEDURE — 77067 SCR MAMMO BI INCL CAD: CPT

## 2024-08-23 PROCEDURE — 77063 BREAST TOMOSYNTHESIS BI: CPT | Performed by: RADIOLOGY

## 2024-09-12 ENCOUNTER — APPOINTMENT (OUTPATIENT)
Dept: OTOLARYNGOLOGY | Facility: CLINIC | Age: 79
End: 2024-09-12
Payer: MEDICARE

## 2024-09-12 DIAGNOSIS — H61.20 IMPACTED CERUMEN, UNSPECIFIED LATERALITY: Primary | ICD-10-CM

## 2024-09-12 PROCEDURE — 69210 REMOVE IMPACTED EAR WAX UNI: CPT | Performed by: PHYSICIAN ASSISTANT

## 2024-09-12 PROCEDURE — 99212 OFFICE O/P EST SF 10 MIN: CPT | Performed by: PHYSICIAN ASSISTANT

## 2024-09-12 NOTE — PROGRESS NOTES
Kavya Anthony is a 79 y.o. year old female patient with Cerumen Impaction       Patient presents to the office today for assessment of ears.  Patient here today for removal of cerumen.  The patient is otherwise well without other ENT related concerns at this time.    Review of Systems   All other systems reviewed and are negative.        Physical Exam:   General appearance: No acute distress. Normal facies. Symmetric facial movement. No gross lesions of the face are noted.  The external ear structures appear normal.  Bilateral cerumen was removed under the microscope with used to curette, alligator forceps, and hydrogen peroxide with suction as necessary. Following the removal, the ear structures appear to be otherwise grossly normal.  Anterior rhinoscopy notes essentially a midline nasal septum. Examination is noted for normal healthy mucosal membranes without any evidence of lesions, polyps, or exudate. The tongue is normally mobile. There are no lesions on the gingiva, buccal, or oral mucosa. There are no oral cavity masses.  The neck is negative for mass lymphadenopathy. The trachea and parotid are clear. The thyroid bed is grossly unremarkable. The salivary gland structures are grossly unremarkable.      Procedure: Bilateral cerumen was removed under the microscope with used to curette, alligator forceps, and hydrogen peroxide with suction as necessary. Following the removal, the ear structures appear to be otherwise grossly normal.      Assessment/Plan     Cerumen impaction    Patient with bilateral cerumen impaction now status post removal.  Recommendation at this time is observation and follow-up as needed.

## 2024-10-06 ENCOUNTER — HOSPITAL ENCOUNTER (EMERGENCY)
Facility: HOSPITAL | Age: 79
Discharge: HOME | End: 2024-10-06
Attending: EMERGENCY MEDICINE
Payer: MEDICARE

## 2024-10-06 ENCOUNTER — APPOINTMENT (OUTPATIENT)
Dept: CARDIOLOGY | Facility: HOSPITAL | Age: 79
End: 2024-10-06
Payer: MEDICARE

## 2024-10-06 ENCOUNTER — APPOINTMENT (OUTPATIENT)
Dept: RADIOLOGY | Facility: HOSPITAL | Age: 79
End: 2024-10-06
Payer: MEDICARE

## 2024-10-06 VITALS
WEIGHT: 180 LBS | HEIGHT: 67 IN | SYSTOLIC BLOOD PRESSURE: 169 MMHG | TEMPERATURE: 98.2 F | DIASTOLIC BLOOD PRESSURE: 79 MMHG | OXYGEN SATURATION: 96 % | HEART RATE: 71 BPM | BODY MASS INDEX: 28.25 KG/M2 | RESPIRATION RATE: 17 BRPM

## 2024-10-06 DIAGNOSIS — N39.0 URINARY TRACT INFECTION WITHOUT HEMATURIA, SITE UNSPECIFIED: Primary | ICD-10-CM

## 2024-10-06 DIAGNOSIS — E83.42 HYPOMAGNESEMIA: ICD-10-CM

## 2024-10-06 DIAGNOSIS — R42 DIZZINESS: ICD-10-CM

## 2024-10-06 DIAGNOSIS — R10.84 GENERALIZED ABDOMINAL PAIN: ICD-10-CM

## 2024-10-06 LAB
ALBUMIN SERPL BCP-MCNC: 4.2 G/DL (ref 3.4–5)
ALP SERPL-CCNC: 81 U/L (ref 33–136)
ALT SERPL W P-5'-P-CCNC: 22 U/L (ref 7–45)
ANION GAP SERPL CALC-SCNC: 14 MMOL/L (ref 10–20)
APPEARANCE UR: CLEAR
AST SERPL W P-5'-P-CCNC: 20 U/L (ref 9–39)
ATRIAL RATE: 69 BPM
BACTERIA #/AREA URNS AUTO: ABNORMAL /HPF
BASOPHILS # BLD AUTO: 0.03 X10*3/UL (ref 0–0.1)
BASOPHILS NFR BLD AUTO: 0.4 %
BILIRUB SERPL-MCNC: 0.8 MG/DL (ref 0–1.2)
BILIRUB UR STRIP.AUTO-MCNC: NEGATIVE MG/DL
BUN SERPL-MCNC: 13 MG/DL (ref 6–23)
CALCIUM SERPL-MCNC: 10 MG/DL (ref 8.6–10.3)
CARDIAC TROPONIN I PNL SERPL HS: 5 NG/L (ref 0–13)
CARDIAC TROPONIN I PNL SERPL HS: 7 NG/L (ref 0–13)
CHLORIDE SERPL-SCNC: 107 MMOL/L (ref 98–107)
CO2 SERPL-SCNC: 24 MMOL/L (ref 21–32)
COLOR UR: ABNORMAL
CREAT SERPL-MCNC: 0.79 MG/DL (ref 0.5–1.05)
EGFRCR SERPLBLD CKD-EPI 2021: 76 ML/MIN/1.73M*2
EOSINOPHIL # BLD AUTO: 0.12 X10*3/UL (ref 0–0.4)
EOSINOPHIL NFR BLD AUTO: 1.5 %
ERYTHROCYTE [DISTWIDTH] IN BLOOD BY AUTOMATED COUNT: 13.2 % (ref 11.5–14.5)
FLUAV RNA RESP QL NAA+PROBE: NOT DETECTED
FLUBV RNA RESP QL NAA+PROBE: NOT DETECTED
GLUCOSE SERPL-MCNC: 143 MG/DL (ref 74–99)
GLUCOSE UR STRIP.AUTO-MCNC: NORMAL MG/DL
HCT VFR BLD AUTO: 36.7 % (ref 36–46)
HGB BLD-MCNC: 12.6 G/DL (ref 12–16)
HOLD SPECIMEN: NORMAL
IMM GRANULOCYTES # BLD AUTO: 0.02 X10*3/UL (ref 0–0.5)
IMM GRANULOCYTES NFR BLD AUTO: 0.3 % (ref 0–0.9)
INR PPP: 1.1 (ref 0.9–1.1)
KETONES UR STRIP.AUTO-MCNC: ABNORMAL MG/DL
LACTATE SERPL-SCNC: 1.7 MMOL/L (ref 0.4–2)
LACTATE SERPL-SCNC: 2.6 MMOL/L (ref 0.4–2)
LEUKOCYTE ESTERASE UR QL STRIP.AUTO: ABNORMAL
LIPASE SERPL-CCNC: 32 U/L (ref 9–82)
LYMPHOCYTES # BLD AUTO: 2.36 X10*3/UL (ref 0.8–3)
LYMPHOCYTES NFR BLD AUTO: 29.6 %
MAGNESIUM SERPL-MCNC: 1.51 MG/DL (ref 1.6–2.4)
MCH RBC QN AUTO: 28 PG (ref 26–34)
MCHC RBC AUTO-ENTMCNC: 34.3 G/DL (ref 32–36)
MCV RBC AUTO: 82 FL (ref 80–100)
MONOCYTES # BLD AUTO: 0.81 X10*3/UL (ref 0.05–0.8)
MONOCYTES NFR BLD AUTO: 10.2 %
NEUTROPHILS # BLD AUTO: 4.62 X10*3/UL (ref 1.6–5.5)
NEUTROPHILS NFR BLD AUTO: 58 %
NITRITE UR QL STRIP.AUTO: NEGATIVE
NRBC BLD-RTO: 0 /100 WBCS (ref 0–0)
P AXIS: 60 DEGREES
P OFFSET: 164 MS
P ONSET: 119 MS
PH UR STRIP.AUTO: 8 [PH]
PLATELET # BLD AUTO: 219 X10*3/UL (ref 150–450)
POTASSIUM SERPL-SCNC: 3.9 MMOL/L (ref 3.5–5.3)
PR INTERVAL: 218 MS
PROT SERPL-MCNC: 6.8 G/DL (ref 6.4–8.2)
PROT UR STRIP.AUTO-MCNC: NEGATIVE MG/DL
PROTHROMBIN TIME: 12 SECONDS (ref 9.8–12.8)
Q ONSET: 228 MS
QRS COUNT: 12 BEATS
QRS DURATION: 68 MS
QT INTERVAL: 416 MS
QTC CALCULATION(BAZETT): 445 MS
QTC FREDERICIA: 436 MS
R AXIS: 75 DEGREES
RBC # BLD AUTO: 4.5 X10*6/UL (ref 4–5.2)
RBC # UR STRIP.AUTO: NEGATIVE /UL
RBC #/AREA URNS AUTO: ABNORMAL /HPF
SARS-COV-2 RNA RESP QL NAA+PROBE: NOT DETECTED
SODIUM SERPL-SCNC: 141 MMOL/L (ref 136–145)
SP GR UR STRIP.AUTO: 1.01
T AXIS: 171 DEGREES
T OFFSET: 436 MS
UROBILINOGEN UR STRIP.AUTO-MCNC: NORMAL MG/DL
VENTRICULAR RATE: 69 BPM
WBC # BLD AUTO: 8 X10*3/UL (ref 4.4–11.3)
WBC #/AREA URNS AUTO: ABNORMAL /HPF

## 2024-10-06 PROCEDURE — 81001 URINALYSIS AUTO W/SCOPE: CPT | Performed by: EMERGENCY MEDICINE

## 2024-10-06 PROCEDURE — 84484 ASSAY OF TROPONIN QUANT: CPT | Performed by: EMERGENCY MEDICINE

## 2024-10-06 PROCEDURE — 87636 SARSCOV2 & INF A&B AMP PRB: CPT | Performed by: EMERGENCY MEDICINE

## 2024-10-06 PROCEDURE — 70450 CT HEAD/BRAIN W/O DYE: CPT | Performed by: RADIOLOGY

## 2024-10-06 PROCEDURE — 85610 PROTHROMBIN TIME: CPT | Performed by: EMERGENCY MEDICINE

## 2024-10-06 PROCEDURE — 87086 URINE CULTURE/COLONY COUNT: CPT | Mod: ELYLAB | Performed by: EMERGENCY MEDICINE

## 2024-10-06 PROCEDURE — 96365 THER/PROPH/DIAG IV INF INIT: CPT

## 2024-10-06 PROCEDURE — 36415 COLL VENOUS BLD VENIPUNCTURE: CPT | Performed by: EMERGENCY MEDICINE

## 2024-10-06 PROCEDURE — 93005 ELECTROCARDIOGRAM TRACING: CPT

## 2024-10-06 PROCEDURE — 96361 HYDRATE IV INFUSION ADD-ON: CPT

## 2024-10-06 PROCEDURE — 71045 X-RAY EXAM CHEST 1 VIEW: CPT

## 2024-10-06 PROCEDURE — 96375 TX/PRO/DX INJ NEW DRUG ADDON: CPT

## 2024-10-06 PROCEDURE — 83735 ASSAY OF MAGNESIUM: CPT | Performed by: EMERGENCY MEDICINE

## 2024-10-06 PROCEDURE — 2500000004 HC RX 250 GENERAL PHARMACY W/ HCPCS (ALT 636 FOR OP/ED): Performed by: EMERGENCY MEDICINE

## 2024-10-06 PROCEDURE — 83690 ASSAY OF LIPASE: CPT | Performed by: EMERGENCY MEDICINE

## 2024-10-06 PROCEDURE — 74177 CT ABD & PELVIS W/CONTRAST: CPT

## 2024-10-06 PROCEDURE — 80053 COMPREHEN METABOLIC PANEL: CPT | Performed by: EMERGENCY MEDICINE

## 2024-10-06 PROCEDURE — 2550000001 HC RX 255 CONTRASTS: Performed by: EMERGENCY MEDICINE

## 2024-10-06 PROCEDURE — 99285 EMERGENCY DEPT VISIT HI MDM: CPT | Mod: 25,CS

## 2024-10-06 PROCEDURE — 71045 X-RAY EXAM CHEST 1 VIEW: CPT | Performed by: RADIOLOGY

## 2024-10-06 PROCEDURE — 74177 CT ABD & PELVIS W/CONTRAST: CPT | Performed by: RADIOLOGY

## 2024-10-06 PROCEDURE — 70450 CT HEAD/BRAIN W/O DYE: CPT

## 2024-10-06 PROCEDURE — 83605 ASSAY OF LACTIC ACID: CPT | Performed by: EMERGENCY MEDICINE

## 2024-10-06 PROCEDURE — 85025 COMPLETE CBC W/AUTO DIFF WBC: CPT | Performed by: EMERGENCY MEDICINE

## 2024-10-06 PROCEDURE — 96367 TX/PROPH/DG ADDL SEQ IV INF: CPT

## 2024-10-06 RX ORDER — FENTANYL CITRATE 50 UG/ML
25 INJECTION, SOLUTION INTRAMUSCULAR; INTRAVENOUS ONCE
Status: COMPLETED | OUTPATIENT
Start: 2024-10-06 | End: 2024-10-06

## 2024-10-06 RX ORDER — CEPHALEXIN 500 MG/1
500 CAPSULE ORAL 2 TIMES DAILY
Qty: 14 CAPSULE | Refills: 0 | Status: SHIPPED | OUTPATIENT
Start: 2024-10-06 | End: 2024-10-13

## 2024-10-06 RX ORDER — ONDANSETRON 4 MG/1
4 TABLET, ORALLY DISINTEGRATING ORAL EVERY 8 HOURS PRN
Qty: 15 TABLET | Refills: 0 | Status: SHIPPED | OUTPATIENT
Start: 2024-10-06 | End: 2024-10-11

## 2024-10-06 RX ORDER — MAGNESIUM SULFATE HEPTAHYDRATE 40 MG/ML
2 INJECTION, SOLUTION INTRAVENOUS ONCE
Status: COMPLETED | OUTPATIENT
Start: 2024-10-06 | End: 2024-10-06

## 2024-10-06 RX ORDER — FAMOTIDINE 10 MG/ML
20 INJECTION INTRAVENOUS ONCE
Status: COMPLETED | OUTPATIENT
Start: 2024-10-06 | End: 2024-10-06

## 2024-10-06 RX ORDER — CEFTRIAXONE 1 G/50ML
1 INJECTION, SOLUTION INTRAVENOUS ONCE
Status: COMPLETED | OUTPATIENT
Start: 2024-10-06 | End: 2024-10-06

## 2024-10-06 RX ORDER — ONDANSETRON HYDROCHLORIDE 2 MG/ML
4 INJECTION, SOLUTION INTRAVENOUS ONCE
Status: COMPLETED | OUTPATIENT
Start: 2024-10-06 | End: 2024-10-06

## 2024-10-06 ASSESSMENT — COLUMBIA-SUICIDE SEVERITY RATING SCALE - C-SSRS
2. HAVE YOU ACTUALLY HAD ANY THOUGHTS OF KILLING YOURSELF?: NO
6. HAVE YOU EVER DONE ANYTHING, STARTED TO DO ANYTHING, OR PREPARED TO DO ANYTHING TO END YOUR LIFE?: NO
1. IN THE PAST MONTH, HAVE YOU WISHED YOU WERE DEAD OR WISHED YOU COULD GO TO SLEEP AND NOT WAKE UP?: NO

## 2024-10-06 ASSESSMENT — PAIN SCALES - GENERAL
PAINLEVEL_OUTOF10: 2
PAINLEVEL_OUTOF10: 4

## 2024-10-06 ASSESSMENT — LIFESTYLE VARIABLES
HAVE YOU EVER FELT YOU SHOULD CUT DOWN ON YOUR DRINKING: NO
HAVE PEOPLE ANNOYED YOU BY CRITICIZING YOUR DRINKING: NO
TOTAL SCORE: 0
EVER FELT BAD OR GUILTY ABOUT YOUR DRINKING: NO
EVER HAD A DRINK FIRST THING IN THE MORNING TO STEADY YOUR NERVES TO GET RID OF A HANGOVER: NO

## 2024-10-06 ASSESSMENT — PAIN DESCRIPTION - DESCRIPTORS: DESCRIPTORS: ACHING

## 2024-10-06 ASSESSMENT — PAIN DESCRIPTION - PAIN TYPE: TYPE: ACUTE PAIN

## 2024-10-06 ASSESSMENT — PAIN - FUNCTIONAL ASSESSMENT
PAIN_FUNCTIONAL_ASSESSMENT: 0-10
PAIN_FUNCTIONAL_ASSESSMENT: 0-10

## 2024-10-06 ASSESSMENT — PAIN DESCRIPTION - ORIENTATION: ORIENTATION: RIGHT;UPPER

## 2024-10-06 ASSESSMENT — PAIN DESCRIPTION - LOCATION: LOCATION: ABDOMEN

## 2024-10-06 NOTE — ED PROVIDER NOTES
79-year-old female presents emergency department with chief complaint of feeling generally unwell.  She states she has been having the symptoms since Wednesday.  Patient reports feeling dizzy she describes this as feeling lightheaded like she may pass out.  She also admits to feeling nauseated.  Denies vomiting.  She states due to her symptoms she has not been eating or drinking very much.  Denies fevers.  Admits occasional cough.  Patient denies any chest pain or significant difficulty breathing.  She does report some upper abdominal pain worse on the right side.  No dysuria, constipation, black or bloody stools.  Patient does admit to diarrhea. Chart review shows significant past medical history of hypothyroidism, anxiety, GERD, vertigo, diabetes, CAD, elevated BMI, and patient is on Xarelto.  No recent head injury or trauma.  No reported tobacco use, illicit drug use, or regular alcohol use.      History provided by:  Patient and relative   used: No           ------------------------------------------------------------------------------------------------------------------------------------------    VS: As documented in the triage note and EMR flowsheet from this visit were reviewed.    Review of Systems  Constitutional: no fever, chills reports malaise and fatigue  Eyes: no redness, discharge, pain  HENT: no sore throat, nose bleeds, congestion, rhinorrhea   Cardiovascular: no chest pain, leg edema, palpitations  Respiratory: no shortness of breath, wheezing admits occasional cough  GI: Reports nausea and upper abdominal pain.  Admits to diarrhea no vomiting, constipation, BRBPR, melena  : no dysuria, frequency, hematuria  Musculoskeletal: no neck pain, stiffness,  no joint deformity, swelling  Skin: no rash, erythema, wounds  Neurological: no headache, dizziness, lightheadedness, weakness, numbness, or tingling  Psychiatric: no suicidal thoughts, confusion, agitation  Metabolic: no polyuria  or polydipsia  Hematologic: Patient is on Xarelto  Immunology: No immunocompromise state    [facility]  Nursing notes reviewed and confirmed by me.  Chart review performed including medications, allergies, and medical, surgical, and family history  Visit Vitals  /79 (BP Location: Right arm, Patient Position: Lying)   Pulse 71   Temp 36.8 °C (98.2 °F) (Temporal)   Resp 17     Physical Exam  Vitals and nursing note reviewed.   Constitutional:       General: She is not in acute distress.     Appearance: Normal appearance. She is ill-appearing.   HENT:      Head: Normocephalic and atraumatic.      Right Ear: External ear normal.      Left Ear: External ear normal.      Nose: Nose normal. No congestion or rhinorrhea.      Mouth/Throat:      Mouth: Mucous membranes are dry.      Pharynx: No oropharyngeal exudate or posterior oropharyngeal erythema.   Eyes:      Extraocular Movements: Extraocular movements intact.      Conjunctiva/sclera: Conjunctivae normal.      Pupils: Pupils are equal, round, and reactive to light.   Cardiovascular:      Rate and Rhythm: Normal rate and regular rhythm.      Pulses: Normal pulses.      Heart sounds: Normal heart sounds.   Pulmonary:      Effort: Pulmonary effort is normal. No respiratory distress.      Breath sounds: Normal breath sounds. No stridor. No wheezing, rhonchi or rales.   Abdominal:      General: There is no distension.      Palpations: Abdomen is soft.      Tenderness: There is abdominal tenderness (Mild tenderness bilateral upper quadrant seems to localize to the right). There is no guarding or rebound.   Musculoskeletal:         General: No swelling or deformity. Normal range of motion.      Cervical back: Normal range of motion and neck supple. No rigidity.      Right lower leg: No edema.      Left lower leg: No edema.      Comments: No calf tenderness    Skin:     General: Skin is warm and dry.      Capillary Refill: Capillary refill takes less than 2 seconds.       Coloration: Skin is not jaundiced.      Findings: No rash.   Neurological:      General: No focal deficit present.      Mental Status: She is alert and oriented to person, place, and time.      Sensory: No sensory deficit.      Motor: No weakness.      Comments: Cranial nerves II through XII grossly intact.  NIH stroke scale is 0.   Psychiatric:         Mood and Affect: Mood normal.         Behavior: Behavior normal.        Past Medical History:   Diagnosis Date    Anesthesia of skin 03/30/2021    Numbness and tingling    Breast cancer (Multi)     Disease of digestive system, unspecified 03/30/2021    Digestive problems    History of artificial lens replacement 05/04/2015    Hx antineoplastic chemo     Other general symptoms and signs 03/30/2021    Eye, ear, nose, and throat symptom    Other specified disorders of breast 10/21/2020    Acquired breast deformity    Perianal venous thrombosis 10/03/2019    Thrombosed external hemorrhoids    Personal history of irradiation     Personal history of malignant neoplasm, unspecified 03/30/2021    History of malignant neoplasm    Personal history of other diseases of the digestive system     History of hemorrhoids    Personal history of other diseases of urinary system     History of hematuria    Personal history of other endocrine, nutritional and metabolic disease 03/30/2021    History of diabetes mellitus    Personal history of other specified conditions 06/19/2021    History of urinary frequency    Personal history of other specified conditions 06/19/2021    History of dysuria    Personal history of other specified conditions 06/19/2021    History of urinary urgency    Personal history of other specified conditions 03/30/2021    History of balance disorder    Personal history of other specified conditions 12/26/2019    History of vertigo    Personal history of urinary (tract) infections     History of chronic urinary tract infection    Personal history of urinary (tract)  infections 06/04/2021    History of urinary tract infection    Presence of unspecified artificial knee joint     History of knee replacement    Radial styloid tenosynovitis (de quervain) 12/02/2021    Radial styloid tenosynovitis [de quervain]    Sensorineural hearing loss, bilateral 02/10/2021    Bilateral sensorineural hearing loss    Unspecified visual loss 03/30/2021    Vision problems      Past Surgical History:   Procedure Laterality Date    BREAST LUMPECTOMY      OTHER SURGICAL HISTORY  08/22/2019    Knee replacement    OTHER SURGICAL HISTORY  08/31/2020    Cataract surgery    OTHER SURGICAL HISTORY  08/31/2020    Breast biopsy    OTHER SURGICAL HISTORY  12/12/2019    Cholecystectomy    OTHER SURGICAL HISTORY  12/12/2019    Appendectomy    OTHER SURGICAL HISTORY  12/12/2019    Total hysterectomy with removal of both tubes and ovaries      Social History     Socioeconomic History    Marital status:    Tobacco Use    Smoking status: Never     Passive exposure: Never    Smokeless tobacco: Never   Substance and Sexual Activity    Alcohol use: Never    Drug use: Never      ------------------------------------------------------------------------------------------------------------------------------------------  CT abdomen pelvis w IV contrast   Final Result   Concentric bladder wall thickening with mild adjacent stranding and   punctate lucency in the nondependent portions of the urinary bladder.   Findings are concerning for cystitis. Correlate with urinalysis.        Colonic wall thickening from the level of the splenic flexure through   the rectum. Recommend nonemergent colonoscopy to further   characterize. Additionally, there are scattered colonic diverticula,   however, no definite pericolonic inflammatory stranding to suggest   diverticulitis is evident.        Gastric wall thickening versus underdistention. Correlate for   symptoms of gastritis.        Small hiatal hernia.        MACRO:   None.         Signed by: Evan Finkelstein 10/6/2024 5:55 AM   Dictation workstation:   [id number]      CT head wo IV contrast   Final Result   No acute intracranial hemorrhage, mass effect or midline shift.        Nonspecific periventricular white matter hypodensities favored to   represent sequela of small vessel ischemia.                  MACRO:   None.        Signed by: Evan Finkelstein 10/6/2024 5:48 AM   Dictation workstation:   [id number]      XR chest 1 view   Final Result   No radiographic evidence of acute cardiopulmonary pathology.        MACRO:   None.        Signed by: Evan Finkelstein 10/6/2024 5:56 AM   Dictation workstation:   [id number]         Labs Reviewed   CBC WITH AUTO DIFFERENTIAL - Abnormal       Result Value    WBC 8.0      nRBC 0.0      RBC 4.50      Hemoglobin 12.6      Hematocrit 36.7      MCV 82      MCH 28.0      MCHC 34.3      RDW 13.2      Platelets 219      Neutrophils % 58.0      Immature Granulocytes %, Automated 0.3      Lymphocytes % 29.6      Monocytes % 10.2      Eosinophils % 1.5      Basophils % 0.4      Neutrophils Absolute 4.62      Immature Granulocytes Absolute, Automated 0.02      Lymphocytes Absolute 2.36      Monocytes Absolute 0.81 (*)     Eosinophils Absolute 0.12      Basophils Absolute 0.03     COMPREHENSIVE METABOLIC PANEL - Abnormal    Glucose 143 (*)     Sodium 141      Potassium 3.9      Chloride 107      Bicarbonate 24      Anion Gap 14      Urea Nitrogen 13      Creatinine 0.79      eGFR 76      Calcium 10.0      Albumin 4.2      Alkaline Phosphatase 81      Total Protein 6.8      AST 20      Bilirubin, Total 0.8      ALT 22     MAGNESIUM - Abnormal    Magnesium 1.51 (*)    LACTATE - Abnormal    Lactate 2.6 (*)     Narrative:     Venipuncture immediately after or during the administration of Metamizole may lead to falsely low results. Testing should be performed immediately prior to Metamizole dosing.   URINALYSIS WITH REFLEX CULTURE AND MICROSCOPIC - Abnormal     Color, Urine Light-Yellow      Appearance, Urine Clear      Specific Gravity, Urine 1.009      pH, Urine 8.0      Protein, Urine NEGATIVE      Glucose, Urine Normal      Blood, Urine NEGATIVE      Ketones, Urine TRACE (*)     Bilirubin, Urine NEGATIVE      Urobilinogen, Urine Normal      Nitrite, Urine NEGATIVE      Leukocyte Esterase, Urine 250 Kota/µL (*)    MICROSCOPIC ONLY, URINE - Abnormal    WBC, Urine 11-20 (*)     RBC, Urine 1-2      Bacteria, Urine 3+ (*)    PROTIME-INR - Normal    Protime 12.0      INR 1.1     LIPASE - Normal    Lipase 32      Narrative:     Venipuncture immediately after or during the administration of Metamizole may lead to falsely low results. Testing should be performed immediately prior to Metamizole dosing.   SARS-COV-2 PCR - Normal    Coronavirus 2019, PCR Not Detected      Narrative:     This assay has received FDA Emergency Use Authorization (EUA) and is only authorized for the duration of time that circumstances exist to justify the authorization of the emergency use of in vitro diagnostic tests for the detection of SARS-CoV-2 virus and/or diagnosis of COVID-19 infection under section 564(b)(1) of the Act, 21 U.S.C. 360bbb-3(b)(1). This assay is an in vitro diagnostic nucleic acid amplification test for the qualitative detection of SARS-CoV-2 from nasopharyngeal specimens and has been validated for use at [facility]. Negative results do not preclude COVID-19 infections and should not be used as the sole basis for diagnosis, treatment, or other management decisions.     INFLUENZA A AND B PCR - Normal    Flu A Result Not Detected      Flu B Result Not Detected      Narrative:     This assay is an in vitro diagnostic multiplex nucleic acid amplification test for the detection and discrimination of Influenza A & B from nasopharyngeal specimens, and has been validated for use at [facility]. Negative results do not preclude Influenza A/B  infections, and should not be used as the sole basis for diagnosis, treatment, or other management decisions. If Influenza A/B and RSV PCR results are negative, testing for Parainfluenza virus, Adenovirus and Metapneumovirus is routinely performed for [facility] pediatric oncology and intensive care inpatients, and is available on other patients by placing an add-on request.   SERIAL TROPONIN-INITIAL - Normal    Troponin I, High Sensitivity 5      Narrative:     Less than 99th percentile of normal range cutoff-  Female and children under 18 years old <14 ng/L; Male <21 ng/L: Negative  Repeat testing should be performed if clinically indicated.     Female and children under 18 years old 14-50 ng/L; Male 21-50 ng/L:  Consistent with possible cardiac damage and possible increased clinical   risk. Serial measurements may help to assess extent of myocardial damage.     >50 ng/L: Consistent with cardiac damage, increased clinical risk and  myocardial infarction. Serial measurements may help assess extent of   myocardial damage.      NOTE: Children less than 1 year old may have higher baseline troponin   levels and results should be interpreted in conjunction with the overall   clinical context.     NOTE: Troponin I testing is performed using a different   testing methodology at [facility] than at other   [facility]. Direct result comparisons should only   be made within the same method.   SERIAL TROPONIN, 1 HOUR - Normal    Troponin I, High Sensitivity 7      Narrative:     Less than 99th percentile of normal range cutoff-  Female and children under 18 years old <14 ng/L; Male <21 ng/L: Negative  Repeat testing should be performed if clinically indicated.     Female and children under 18 years old 14-50 ng/L; Male 21-50 ng/L:  Consistent with possible cardiac damage and possible increased clinical   risk. Serial measurements may help to assess extent of myocardial damage.     >50 ng/L: Consistent with cardiac  damage, increased clinical risk and  myocardial infarction. Serial measurements may help assess extent of   myocardial damage.      NOTE: Children less than 1 year old may have higher baseline troponin   levels and results should be interpreted in conjunction with the overall   clinical context.     NOTE: Troponin I testing is performed using a different   testing methodology at [facility] than at other   [facility]. Direct result comparisons should only   be made within the same method.   LACTATE - Normal    Lactate 1.7      Narrative:     Venipuncture immediately after or during the administration of Metamizole may lead to falsely low results. Testing should be performed immediately prior to Metamizole dosing.   URINE CULTURE   TROPONIN SERIES- (INITIAL, 1 HR)    Narrative:     The following orders were created for panel order Troponin I Series, High Sensitivity (0, 1 HR).  Procedure                               Abnormality         Status                     ---------                               -----------         ------                     Troponin I, High Sensiti...[[id number]]  Normal              Final result               Troponin, High Sensitivi...[[id number]]  Normal              Final result                 Please view results for these tests on the individual orders.   URINALYSIS WITH REFLEX CULTURE AND MICROSCOPIC    Narrative:     The following orders were created for panel order Urinalysis with Reflex Culture and Microscopic.  Procedure                               Abnormality         Status                     ---------                               -----------         ------                     Urinalysis with Reflex C...[[id number]]  Abnormal            Final result               Extra Urine Gray Tube[[id number]]                            In process                   Please view results for these tests on the individual orders.   EXTRA URINE GRAY TUBE        Medical Decision  Making  EKG interpreted by ED physician: Sinus rhythm with first-degree AV block and occasional PVC.  Ventricular rate of 69.  CO prolonged at 218 consistent with first-degree AV block.  QRS and QTc are within normal range.  Good R wave progression.  Normal axis.  Nonspecific ST-T wave changes throughout EKG.  Good R wave progression.  Normal axis.    79-year-old female presents emergency department accompanied by daughter with report of feeling generally unwell, dizzy, and nauseated.  Patient also reports decreased p.o. intake.  On my exam she is afebrile and nontoxic.  NIH stroke scale is 0.  A thorough workup was obtained given her presenting symptoms.  Cardiac enzymes x 2 and EKG do not show acute ACS.  Lipase within normal range I do not suspect pancreatitis.  CMP shows no significant metabolic abnormality.  CBC does not show significant leukocytosis or anemia.  Magnesium is slightly low and this was repleted with IV magnesium.  However, patient did not wish to wait for the completion of the infusion and only received half a gram.  Influenza and COVID testing are negative.  Lipase within normal range I do not suspect ischemic cause of her symptoms.  UA is concerning for urinary tract infection patient treated with ceftriaxone here and Keflex for home.  Head CT shows no acute intracranial process such as hemorrhage or mass effect.  CT abdomen pelvis is consistent with cystitis.  No bowel obstruction or bowel perforation.  No acute intra-abdominal infection appreciated.  Chest x-ray showed no acute cardiopulmonary process such as pneumonia, pleural effusion, or pulmonary edema.  On reevaluation after symptomatic treatment with Zofran and IV fluids as well as Pepcid and fentanyl patient reports improvement of her symptoms, but not complete resolution.  I recommended admission to the patient and daughter at bedside due to her symptoms and findings of UTI.  We discussed urinary tract infection can make people more  confused, generally weak, and ill particularly in the elderly.  We also discussed her reported dizziness and possibility of posterior stroke versus peripheral vertigo.  I discussed concerns regarding fall risk at home with the patient.  Patient's daughter was also present for conversation.  The patient declined admission stating she was feeling better and did not want to stay in the hospital.  She did voice understanding of risk and was alert and oriented x 4 on my exam.  I welcomed patient and family back should they change their mind at any time if patient wants admitted or further evaluation.  Patient also given return precautions and advised follow-up with primary care.  Patient was ambulated in department prior to discharge and per nursing staff ambulated with normal steady gait.  Patient and family comfortable returning home and understand the need for follow-up.       Diagnoses as of 10/06/24 0919   Dizziness   Generalized abdominal pain   Urinary tract infection without hematuria, site unspecified   Hypomagnesemia      1. Urinary tract infection without hematuria, site unspecified  cephalexin (Keflex) 500 mg capsule    ondansetron ODT (Zofran-ODT) 4 mg disintegrating tablet      2. Dizziness        3. Generalized abdominal pain        4. Hypomagnesemia           Procedures     This note was dictated using dragon software and may contain errors related to dictation interpretation errors.      Prasad Brownlee, DO  10/06/24 0919

## 2024-10-06 NOTE — DISCHARGE INSTRUCTIONS
Follow-up with your primary care doctor.  Take medications as prescribed.  I recommended that you be admitted to the hospital for further treatment of urinary tract infection as well as workup of your symptoms.  However, you declined admission and would prefer to follow-up outpatient.  If you change your mind at any time you can return to the emergency department for further evaluation and treatment.

## 2024-10-07 LAB — BACTERIA UR CULT: NORMAL

## 2025-01-11 ENCOUNTER — APPOINTMENT (OUTPATIENT)
Dept: RADIOLOGY | Facility: HOSPITAL | Age: 80
End: 2025-01-11
Payer: MEDICARE

## 2025-01-11 ENCOUNTER — HOSPITAL ENCOUNTER (EMERGENCY)
Facility: HOSPITAL | Age: 80
Discharge: HOME | End: 2025-01-11
Payer: MEDICARE

## 2025-01-11 VITALS
RESPIRATION RATE: 18 BRPM | OXYGEN SATURATION: 98 % | DIASTOLIC BLOOD PRESSURE: 71 MMHG | HEART RATE: 70 BPM | HEIGHT: 67 IN | SYSTOLIC BLOOD PRESSURE: 148 MMHG | TEMPERATURE: 97.5 F | BODY MASS INDEX: 30.61 KG/M2 | WEIGHT: 195 LBS

## 2025-01-11 DIAGNOSIS — M25.562 ACUTE PAIN OF LEFT KNEE: Primary | ICD-10-CM

## 2025-01-11 DIAGNOSIS — S83.512A RUPTURE OF ANTERIOR CRUCIATE LIGAMENT OF LEFT KNEE, INITIAL ENCOUNTER: ICD-10-CM

## 2025-01-11 PROCEDURE — 93971 EXTREMITY STUDY: CPT | Mod: FOREIGN READ | Performed by: RADIOLOGY

## 2025-01-11 PROCEDURE — 93971 EXTREMITY STUDY: CPT

## 2025-01-11 PROCEDURE — 73700 CT LOWER EXTREMITY W/O DYE: CPT | Mod: LT

## 2025-01-11 PROCEDURE — 73700 CT LOWER EXTREMITY W/O DYE: CPT | Mod: LEFT SIDE | Performed by: RADIOLOGY

## 2025-01-11 PROCEDURE — 96372 THER/PROPH/DIAG INJ SC/IM: CPT | Performed by: PHYSICIAN ASSISTANT

## 2025-01-11 PROCEDURE — 2500000004 HC RX 250 GENERAL PHARMACY W/ HCPCS (ALT 636 FOR OP/ED): Performed by: PHYSICIAN ASSISTANT

## 2025-01-11 PROCEDURE — 99284 EMERGENCY DEPT VISIT MOD MDM: CPT | Mod: 25

## 2025-01-11 RX ORDER — ONDANSETRON 4 MG/1
4 TABLET, ORALLY DISINTEGRATING ORAL ONCE
Status: COMPLETED | OUTPATIENT
Start: 2025-01-11 | End: 2025-01-11

## 2025-01-11 RX ORDER — MORPHINE SULFATE 4 MG/ML
4 INJECTION, SOLUTION INTRAMUSCULAR; INTRAVENOUS ONCE
Status: COMPLETED | OUTPATIENT
Start: 2025-01-11 | End: 2025-01-11

## 2025-01-11 RX ORDER — HYDROCODONE BITARTRATE AND ACETAMINOPHEN 5; 325 MG/1; MG/1
1 TABLET ORAL EVERY 4 HOURS PRN
Qty: 18 TABLET | Refills: 0 | Status: SHIPPED | OUTPATIENT
Start: 2025-01-11 | End: 2025-01-14

## 2025-01-11 RX ADMIN — MORPHINE SULFATE 4 MG: 4 INJECTION, SOLUTION INTRAMUSCULAR; INTRAVENOUS at 17:24

## 2025-01-11 RX ADMIN — ONDANSETRON 4 MG: 4 TABLET, ORALLY DISINTEGRATING ORAL at 17:25

## 2025-01-11 ASSESSMENT — PAIN - FUNCTIONAL ASSESSMENT: PAIN_FUNCTIONAL_ASSESSMENT: 0-10

## 2025-01-11 ASSESSMENT — PAIN SCALES - GENERAL
PAINLEVEL_OUTOF10: 0 - NO PAIN
PAINLEVEL_OUTOF10: 9
PAINLEVEL_OUTOF10: 9

## 2025-01-11 ASSESSMENT — LIFESTYLE VARIABLES
TOTAL SCORE: 0
EVER FELT BAD OR GUILTY ABOUT YOUR DRINKING: NO
HAVE PEOPLE ANNOYED YOU BY CRITICIZING YOUR DRINKING: NO
HAVE YOU EVER FELT YOU SHOULD CUT DOWN ON YOUR DRINKING: NO
EVER HAD A DRINK FIRST THING IN THE MORNING TO STEADY YOUR NERVES TO GET RID OF A HANGOVER: NO

## 2025-01-11 NOTE — ED PROVIDER NOTES
HPI   Chief Complaint   Patient presents with    Knee Pain     Left knee pain, denies injury or fall       A 79-year-old female patient comes into the emergency department today with complaints of severe left knee pain.  States her knee has been bugging her for a little while but today it fell like there was a pop in her knee and suddenly started getting excruciating pain.  Pain is worse with movement.  Rates the pain a 10 out of 10 on the pain scale.  Describes it as achiness.  States pain is now radiating up the thigh and into the calf.  She otherwise has no other complaints present time for this purpose comes into the emergency department today further evaluation.              Patient History   Past Medical History:   Diagnosis Date    Anesthesia of skin 03/30/2021    Numbness and tingling    Breast cancer (Multi)     Disease of digestive system, unspecified 03/30/2021    Digestive problems    History of artificial lens replacement 05/04/2015    Hx antineoplastic chemo     Other general symptoms and signs 03/30/2021    Eye, ear, nose, and throat symptom    Other specified disorders of breast 10/21/2020    Acquired breast deformity    Perianal venous thrombosis 10/03/2019    Thrombosed external hemorrhoids    Personal history of irradiation     Personal history of malignant neoplasm, unspecified 03/30/2021    History of malignant neoplasm    Personal history of other diseases of the digestive system     History of hemorrhoids    Personal history of other diseases of urinary system     History of hematuria    Personal history of other endocrine, nutritional and metabolic disease 03/30/2021    History of diabetes mellitus    Personal history of other specified conditions 06/19/2021    History of urinary frequency    Personal history of other specified conditions 06/19/2021    History of dysuria    Personal history of other specified conditions 06/19/2021    History of urinary urgency    Personal history of other  specified conditions 03/30/2021    History of balance disorder    Personal history of other specified conditions 12/26/2019    History of vertigo    Personal history of urinary (tract) infections     History of chronic urinary tract infection    Personal history of urinary (tract) infections 06/04/2021    History of urinary tract infection    Presence of unspecified artificial knee joint     History of knee replacement    Radial styloid tenosynovitis (de quervain) 12/02/2021    Radial styloid tenosynovitis [de quervain]    Sensorineural hearing loss, bilateral 02/10/2021    Bilateral sensorineural hearing loss    Unspecified visual loss 03/30/2021    Vision problems     Past Surgical History:   Procedure Laterality Date    BREAST LUMPECTOMY      OTHER SURGICAL HISTORY  08/22/2019    Knee replacement    OTHER SURGICAL HISTORY  08/31/2020    Cataract surgery    OTHER SURGICAL HISTORY  08/31/2020    Breast biopsy    OTHER SURGICAL HISTORY  12/12/2019    Cholecystectomy    OTHER SURGICAL HISTORY  12/12/2019    Appendectomy    OTHER SURGICAL HISTORY  12/12/2019    Total hysterectomy with removal of both tubes and ovaries     Family History   Problem Relation Name Age of Onset    Breast cancer Sister      Ovarian cancer Mother's Sister      Ovarian cancer Father's Sister       Social History     Tobacco Use    Smoking status: Never     Passive exposure: Never    Smokeless tobacco: Never   Substance Use Topics    Alcohol use: Never    Drug use: Never       Physical Exam   ED Triage Vitals [01/11/25 1625]   Temperature Heart Rate Respirations BP   36.4 °C (97.5 °F) 69 18 145/75      Pulse Ox Temp src Heart Rate Source Patient Position   98 % -- -- --      BP Location FiO2 (%)     -- --       Physical Exam  Constitutional:       General: She is in acute distress.      Appearance: Normal appearance. She is not ill-appearing or diaphoretic.   HENT:      Head: Normocephalic and atraumatic.      Nose: Nose normal.   Eyes:       Extraocular Movements: Extraocular movements intact.      Conjunctiva/sclera: Conjunctivae normal.      Pupils: Pupils are equal, round, and reactive to light.   Cardiovascular:      Rate and Rhythm: Normal rate and regular rhythm.   Pulmonary:      Effort: Pulmonary effort is normal. No respiratory distress.      Breath sounds: Normal breath sounds. No stridor. No wheezing.   Musculoskeletal:         General: Tenderness (Lateral left knee, posterior left knee, left calf tenderness.) present. Normal range of motion.      Cervical back: Normal range of motion.      Comments: Pain with anterior drawer test of the left knee   Skin:     General: Skin is warm and dry.   Neurological:      General: No focal deficit present.      Mental Status: She is alert and oriented to person, place, and time. Mental status is at baseline.   Psychiatric:         Mood and Affect: Mood normal.           ED Course & MDM   Diagnoses as of 01/11/25 1857   Acute pain of left knee                 No data recorded     [place] Coma Scale Score: 15 (01/11/25 1626 : Tram Stubbs RN)                           Medical Decision Making  A 79-year-old female patient comes into the emergency department today with complaints of severe left knee pain.  States her knee has been bugging her for a little while but today it fell like there was a pop in her knee and suddenly started getting excruciating pain.  Pain is worse with movement.  Rates the pain a 10 out of 10 on the pain scale.  Describes it as achiness.  States pain is now radiating up the thigh and into the calf.  She otherwise has no other complaints present time for this purpose comes into the emergency department today further evaluation.    CT study of the knee ordered to rule out any tibial plateau abnormality, fracture swelling on the acute bony abnormalities.  IM morphine p.o. Zofran ordered for patient's pain and potential associated nausea secondary to narcotic pain  medication.    Ultrasound study is negative for DVT.  Handoff to Jose wire pending CT study of the knee, reevaluation disposition    Labs Reviewed - No data to display     Vascular US lower extremity venous duplex left   Final Result   No evidence for DVT within the left lower extremity.   Signed by Ken Weinstein MD      CT knee left wo IV contrast    (Results Pending)         Procedure  Procedures     Ayad Santos PA-C  01/11/25 3513

## 2025-01-12 NOTE — PROGRESS NOTES
Emergency Medicine Transition of Care Note.    I received Kavya Anthony in signout from Clark Santos PA-C.  Please see the previous ED provider note for all HPI, PE and MDM up to the time of signout at 1900. This is in addition to the primary record.    In brief Kavya Anthony is an 79 y.o. female presenting for   Chief Complaint   Patient presents with    Knee Pain     Left knee pain, denies injury or fall     At the time of signout we were awaiting: CT    Diagnoses as of 01/11/25 1948   Acute pain of left knee   Rupture of anterior cruciate ligament of left knee, initial encounter       Medical Decision Making    CT scan indicative of moderate to large joint effusion with approximately 4 cm distal and the proximal popliteus tendon and full-thickness ACL tear suspected.  Patient was made aware of all results.  States she has a walker at home and was advised to utilize this with noted minimal weightbearing and was given knee immobilizer.  Will be given prescription for Norco for home.  States she is followed with Dr. Garcia before and plans to schedule a follow-up appointment with him.  Advised RICE.  Instructed to return to the nearest ED if any concerns or new or worsening symptoms. Patient and daughter verbalized understanding and agreement with plan. Discharged in stable condition.      Disclaimer: This note was dictated using speech recognition software. An attempt at proofreading was made to minimize errors. Minor errors in transcription may be present. Please call if questions.    Final diagnoses:   [M25.562] Acute pain of left knee   [S83.512A] Rupture of anterior cruciate ligament of left knee, initial encounter           Procedure  Procedures    Jose Ruff PA-C

## 2025-01-13 ENCOUNTER — OFFICE VISIT (OUTPATIENT)
Dept: ORTHOPEDIC SURGERY | Facility: CLINIC | Age: 80
End: 2025-01-13
Payer: MEDICARE

## 2025-01-13 DIAGNOSIS — M17.12 LOCALIZED OSTEOARTHRITIS OF LEFT KNEE: Primary | ICD-10-CM

## 2025-01-13 PROCEDURE — 99214 OFFICE O/P EST MOD 30 MIN: CPT | Performed by: INTERNAL MEDICINE

## 2025-01-13 PROCEDURE — 99213 OFFICE O/P EST LOW 20 MIN: CPT | Performed by: INTERNAL MEDICINE

## 2025-01-13 PROCEDURE — L1812 KO ELASTIC W/JOINTS PRE OTS: HCPCS | Performed by: INTERNAL MEDICINE

## 2025-01-13 RX ORDER — METHYLPREDNISOLONE 4 MG/1
TABLET ORAL
Qty: 21 TABLET | Refills: 0 | Status: SHIPPED | OUTPATIENT
Start: 2025-01-13

## 2025-01-13 NOTE — PROGRESS NOTES
Acute Injury New Patient Visit    CC:   Chief Complaint   Patient presents with    Left Knee - Pain       HPI: Kavya is a 79 y.o. female presents today for evaluation for acute knee pain and swelling which initially started a week ago and worsened two days ago. No trauma or fall. She was evaluated in the ER where a CT scan and Duplex ultrasound were taken. She is here for initial evaluation.  Has known arthritis in left knee.  She has a past surgical history of a right total knee replacement.  She also complains of pain which radiates up and down her leg to her back.  Denies any stool or urinary continence.        Review of Systems   GENERAL: Negative for malaise, significant weight loss, fever  MUSCULOSKELETAL: See HPI  NEURO:  Negative for numbness / tingling     Past Medical History  Past Medical History:   Diagnosis Date    Anesthesia of skin 03/30/2021    Numbness and tingling    Breast cancer (Multi)     Disease of digestive system, unspecified 03/30/2021    Digestive problems    History of artificial lens replacement 05/04/2015    Hx antineoplastic chemo     Other general symptoms and signs 03/30/2021    Eye, ear, nose, and throat symptom    Other specified disorders of breast 10/21/2020    Acquired breast deformity    Perianal venous thrombosis 10/03/2019    Thrombosed external hemorrhoids    Personal history of irradiation     Personal history of malignant neoplasm, unspecified 03/30/2021    History of malignant neoplasm    Personal history of other diseases of the digestive system     History of hemorrhoids    Personal history of other diseases of urinary system     History of hematuria    Personal history of other endocrine, nutritional and metabolic disease 03/30/2021    History of diabetes mellitus    Personal history of other specified conditions 06/19/2021    History of urinary frequency    Personal history of other specified conditions 06/19/2021    History of dysuria    Personal history of other  specified conditions 06/19/2021    History of urinary urgency    Personal history of other specified conditions 03/30/2021    History of balance disorder    Personal history of other specified conditions 12/26/2019    History of vertigo    Personal history of urinary (tract) infections     History of chronic urinary tract infection    Personal history of urinary (tract) infections 06/04/2021    History of urinary tract infection    Presence of unspecified artificial knee joint     History of knee replacement    Radial styloid tenosynovitis (de quervain) 12/02/2021    Radial styloid tenosynovitis [de quervain]    Sensorineural hearing loss, bilateral 02/10/2021    Bilateral sensorineural hearing loss    Unspecified visual loss 03/30/2021    Vision problems       Medication review  Medication Documentation Review Audit       Reviewed by Tram Stubbs RN (Registered Nurse) on 01/11/25 at 1626      Medication Order Taking? Sig Documenting Provider Last Dose Status   acetaminophen (Tylenol) 325 mg tablet [id number]  Take 1 tablet (325 mg) by mouth every 6 hours. Historical Provider, MD  Active   anastrozole (Arimidex) 1 mg tablet [id number]  Take 1 tablet (1 mg total) by mouth once daily. Historical Provider, MD  Active   baclofen (Lioresal) 10 mg tablet [id number]  Take 1 tablet (10 mg) by mouth. Historical Provider, MD  Active   celecoxib (CeleBREX) 200 mg capsule [id number]  Take 1 capsule (200 mg) by mouth. Historical Provider, MD  Active   cholecalciferol (Vitamin D-3) 50 mcg (2,000 unit) capsule [id number]  Take 1 capsule (50 mcg) by mouth. Historical Provider, MD  Active   cinnamon 500 mg capsule [id number]  Take 1 capsule (500 mg) by mouth once daily. Historical Provider, MD  Active   dexAMETHasone (Decadron) 4 mg tablet [id number]  Take 1 tablet (4 mg) by mouth. Historical Provider, MD  Active   diphenoxylate-atropine (Lomotil) 2.5-0.025 mg tablet [id number]  TAKE ONE TABLET BY MOUTH EVERY 6 HOURS ( FOUR TIMES DAILY ) AS  NEEDED FOR DIARRHEA Historical MD Prosper  Active   DULoxetine (Cymbalta) 30 mg DR capsule [id number]  Take 1 capsule (30 mg) by mouth. Historical Provider, MD  Active   fexofenadine (Allegra) 180 mg tablet [id number]  Take 1 tablet (180 mg) by mouth once daily. Little Cheng MD  Active   furosemide (Lasix) 20 mg tablet [id number]  Take 1 tablet (20 mg) by mouth once daily. Little Cheng MD  Active   furosemide (Lasix) 40 mg tablet [id number]  Take 1 tablet (40 mg) by mouth once daily. Historical Provider, MD  Active   L. acidophilus/Bifid. animalis 15.5 billion cell capsule [id number]  Take by mouth. Historical MD Prosper  Active   levothyroxine (Synthroid, Levoxyl) 88 mcg tablet [id number]  TAKE 1 TABLET DAILY Adonis Simms MD  Active   meclizine (Antivert) 25 mg tablet [id number]  Take 1 tablet (25 mg) by mouth every 8 hours. Little Cheng MD  Active   metFORMIN (Glucophage) 500 mg tablet [id number]  TAKE 1 TABLET TWICE A DAY Adonis Simms MD  Active   ondansetron (Zofran) 4 mg tablet [id number]  TAKE ONE TABLET BY MOUTH FOUR TIMES A DAY AS NEEDED FOR NAUSEA Historical MD Prosper  Active   pantoprazole (ProtoNix) 40 mg EC tablet [id number]  Take 1 tablet (40 mg) by mouth if needed. Little Cheng MD  Active   polymyxin B sulf-trimethoprim (Polytrim) ophthalmic solution [id number]  1 drop 3 times a day. Little Cheng MD  Active   predniSONE (Deltasone) 20 mg tablet [id number]  2 tabs daily x3 days then 1 tablet daily for 2 days then 1/2 tablet for 2 days Fernando Vyas MD  Active   pregabalin (Lyrica) 150 mg capsule [id number]  Take 1 capsule (150 mg) by mouth 2 times a day for 10 days. Macho Wood MD   23 1716   rivaroxaban (Xarelto) 20 mg tablet [id number]  Take 1 tablet (20 mg) by mouth once daily. Historical Provider, MD  Active                    Allergies  Allergies   Allergen Reactions    Prochlorperazine Anxiety, Other and Unknown     Hyperactivity    Codeine Diarrhea     Diclofenac Unknown    Diclofenac Sodium Unknown    Pioglitazone Other    Sulfa (Sulfonamide Antibiotics) Diarrhea and Nausea Only     Upset stomach       Social History  Social History     Socioeconomic History    Marital status:      Spouse name: Not on file    Number of children: Not on file    Years of education: Not on file    Highest education level: Not on file   Occupational History    Not on file   Tobacco Use    Smoking status: Never     Passive exposure: Never    Smokeless tobacco: Never   Substance and Sexual Activity    Alcohol use: Never    Drug use: Never    Sexual activity: Not on file   Other Topics Concern    Not on file   Social History Narrative    Not on file     Social Drivers of Health     Financial Resource Strain: Low Risk  (1/12/2025)    Received from [facility]    Overall Financial Resource Strain (CARDIA)     Difficulty of Paying Living Expenses: Not hard at all   Food Insecurity: No Food Insecurity (1/12/2025)    Received from [facility]    Hunger Vital Sign     Worried About Running Out of Food in the Last Year: Never true     Ran Out of Food in the Last Year: Never true   Transportation Needs: No Transportation Needs (1/12/2025)    Received from [facility]    PRAPARE - Transportation     Lack of Transportation (Medical): No     Lack of Transportation (Non-Medical): No   Physical Activity: Inactive (1/12/2025)    Received from [facility]    Exercise Vital Sign     Days of Exercise per Week: 0 days     Minutes of Exercise per Session: 0 min   Stress: No Stress Concern Present (1/12/2025)    Received from [facility]    [place] [employer] - Occupational Stress Questionnaire     Feeling of Stress : Not at all   Social Connections: Moderately Integrated (1/12/2025)    Received from [facility]    Social Connection and Isolation Panel [NHANES]     Frequency of Communication with Friends and Family: More than three times a week      Frequency of Social Gatherings with Friends and Family: More than three times a week     Attends Buddhist Services: 1 to 4 times per year     Active Member of Clubs or Organizations: Yes     Attends Club or Organization Meetings: More than 4 times per year     Marital Status:    Intimate Partner Violence: Not on file   Housing Stability: Unknown (1/12/2025)    Received from [facility]    Housing Stability Vital Sign     Unable to Pay for Housing in the Last Year: No     Number of Times Moved in the Last Year: Not on file     Homeless in the Last Year: Not on file       Surgical History  Past Surgical History:   Procedure Laterality Date    BREAST LUMPECTOMY      OTHER SURGICAL HISTORY  08/22/2019    Knee replacement    OTHER SURGICAL HISTORY  08/31/2020    Cataract surgery    OTHER SURGICAL HISTORY  08/31/2020    Breast biopsy    OTHER SURGICAL HISTORY  12/12/2019    Cholecystectomy    OTHER SURGICAL HISTORY  12/12/2019    Appendectomy    OTHER SURGICAL HISTORY  12/12/2019    Total hysterectomy with removal of both tubes and ovaries       Physical Exam:  GENERAL:  Patient is awake, alert, and oriented to person place and time.  Patient appears well nourished and well kept.  Affect Calm, Not Acutely Distressed.  HEENT:  Normocephalic, Atraumatic, EOMI  CARDIOVASCULAR:  Hemodynamically stable.  RESPIRATORY:  Normal respirations with unlabored breathing.  Extremity: Left knee examination shows skin is intact.  There is no erythema or warmth.  Trace amount of effusion.  Can flex the right knee to 130 degrees.  Full extension at 0 degrees.  Pain over the medial joint line.  Pain over the lateral joint line.  There is no pain over the patellar or quadricep tendon.  Trace amount of patellar crepitus.  No pain over the proximal tibia.  No pain over the popliteal fossa.  Negative valgus stress test.  Negative varus stress test.  Negative Shila's test medially with no instability.  Negative Shila's test  laterally with no instability.  Negative Lachman's test.  Patellar and quadricep mechanism intact.  Negative anterior and posterior drawer test.  Negative patellar apprehension test.  Distal pulses are palpable, neurovascularly intact.  Walking with no significant antalgic gait.    Lumbar spine examination shows skin is intact.  She has pain with forward flexion and reverse extension.  Pain with left and right lateral rotation.  There is no midline lumbar tenderness.  Mild paraspinal muscle tenderness.  Mild pain of the SI joints.  She is able to walk on her tiptoes and heels with no difficulties.  Quadricep strength is 5/5 on the left, and 5/5 on the right.  Positive straight leg test on the left leg which does reproduce her pain.  Negative cross straight leg test on the right leg.      Diagnostics: CT scan, Duplex ultrasound reviewed  CT knee left wo IV contrast  Narrative: STUDY:  CT Extremity; Completed Time:  01/11/2025, 6:27 PM  INDICATION:  Severe left knee pain secondary to a pop.   COMPARISON:  XR knee 06/20/2023.  ACCESSION NUMBER(S):  [id number]  ORDERING CLINICIAN:  ADRIAN OVALLES  TECHNIQUE:  Thin section axial images were obtained through the Left  knee  without intravenous contrast.  Orthogonal reconstructed images  were obtained and reviewed.    Automated mA/kV exposure control was utilized and patient examination  was performed in strict accordance with principles of ALARA.  FINDINGS:  No acute displaced fracture or dislocation.  Moderate to  severe compartmental osteoarthritis.  Moderate to large joint  effusion.  The anterior cruciate ligament ligament is not well seen.  The  posterior cruciate ligament appears grossly intact.  The medial and  lateral collateral ligament complexes appear grossly intact.  Approximately 4 similar cyst along the proximal popliteus tendon.  The patellar and quadriceps tendon are intact.  The remaining soft  tissues show no concerning CT finding.  Impression: No  acute osseous abnormality.  Moderate to large joint effusion.  Approximately 4 cm cyst along the proximal popliteus tendon.  Evaluation of the anterior cruciate ligament is limited on this exam  but a full-thickness tear is suspected.  This may be acute or chronic.  Signed by Aniket Ward MD  Vascular US lower extremity venous duplex left  Narrative: STUDY:  Left Lower Extremity Venous Doppler Ultrasound; 1/11/2025 6:00 PM.  INDICATION:  Left lower extremity pain x2 days. History of breast cancer.  HISTORY:  Currently not on chemotherapy.  COMPARISON:  None available.  ACCESSION NUMBER(S):  [id number]  ORDERING CLINICIAN:  ADRIAN OVALLES  TECHNIQUE:    Real-time grayscale imaging, color Doppler flow imaging, and spectral  Doppler imaging of the left lower extremity veins was performed.  FINDINGS:   The left common femoral, profunda, femoral, and popliteal veins  demonstrated normal compressibility, normal phasic venous flow and  normal response to augmentation. There is no evidence for echogenic  thrombi. The visualized deep calf veins are patent.    The contralateral common femoral vein is free of thrombosis.  Impression: No evidence for DVT within the left lower extremity.  Signed by Ken Weinstein MD      Procedure: None    Assessment:  Left knee osteoarthritis  Left-sided lumbar radiculopathy      Plan: Kavya presents today for evaluation for acute knee pain and left leg pain which initially started a week ago and worsened two days ago.  Secondary to combination of aggravation of her left knee osteoarthritis and left-sided lumbar radiculopathy.  We recommended a Medrol dose Billy for the acute inflammation of her left knee and lumbar spine, and hinge knee brace for support and stability. We discontinued her knee immobilizer. She is requesting to follow-up with Dr Garcia in 3-4 weeks for reevaluation.      No orders of the defined types were placed in this encounter.     At the conclusion of the visit there  were no further questions by the patient/family regarding their plan of care.  Patient was instructed to call or return with any issues, questions, or concerns regarding their injury and/or treatment plan described above.     01/13/25 at 10:26 AM - Lucia James MD  Scribe Attestation  By signing my name below, I, Pelon Agee, Scribe   attest that this documentation has been prepared under the direction and in the presence of Lucia James MD.    Office: [phone]    This note was prepared using voice recognition software.  The details of this note are correct and have been reviewed, and corrected to the best of my ability.  Some grammatical errors may persist related to the Dragon software.

## 2025-02-04 ENCOUNTER — HOSPITAL ENCOUNTER (OUTPATIENT)
Dept: RADIOLOGY | Facility: CLINIC | Age: 80
Discharge: HOME | End: 2025-02-04
Payer: MEDICARE

## 2025-02-04 ENCOUNTER — OFFICE VISIT (OUTPATIENT)
Dept: ORTHOPEDIC SURGERY | Facility: CLINIC | Age: 80
End: 2025-02-04
Payer: MEDICARE

## 2025-02-04 DIAGNOSIS — M17.12 LOCALIZED OSTEOARTHRITIS OF LEFT KNEE: ICD-10-CM

## 2025-02-04 DIAGNOSIS — M17.12 PRIMARY OSTEOARTHRITIS OF LEFT KNEE: Primary | ICD-10-CM

## 2025-02-04 PROCEDURE — 99214 OFFICE O/P EST MOD 30 MIN: CPT | Mod: 25 | Performed by: ORTHOPAEDIC SURGERY

## 2025-02-04 PROCEDURE — 73564 X-RAY EXAM KNEE 4 OR MORE: CPT | Mod: LEFT SIDE | Performed by: ORTHOPAEDIC SURGERY

## 2025-02-04 PROCEDURE — 73564 X-RAY EXAM KNEE 4 OR MORE: CPT | Mod: LT

## 2025-02-04 PROCEDURE — 20610 DRAIN/INJ JOINT/BURSA W/O US: CPT | Mod: LT | Performed by: ORTHOPAEDIC SURGERY

## 2025-02-04 PROCEDURE — 99214 OFFICE O/P EST MOD 30 MIN: CPT | Performed by: ORTHOPAEDIC SURGERY

## 2025-02-04 PROCEDURE — 1159F MED LIST DOCD IN RCRD: CPT | Performed by: ORTHOPAEDIC SURGERY

## 2025-02-04 PROCEDURE — 1160F RVW MEDS BY RX/DR IN RCRD: CPT | Performed by: ORTHOPAEDIC SURGERY

## 2025-02-04 PROCEDURE — 1036F TOBACCO NON-USER: CPT | Performed by: ORTHOPAEDIC SURGERY

## 2025-02-04 PROCEDURE — 2500000004 HC RX 250 GENERAL PHARMACY W/ HCPCS (ALT 636 FOR OP/ED): Performed by: ORTHOPAEDIC SURGERY

## 2025-02-04 RX ADMIN — LIDOCAINE HYDROCHLORIDE 4 ML: 10 INJECTION, SOLUTION INFILTRATION; PERINEURAL at 11:28

## 2025-02-04 RX ADMIN — BETAMETHASONE ACETATE AND BETAMETHASONE SODIUM PHOSPHATE 2 ML: 3; 3 INJECTION, SUSPENSION INTRA-ARTICULAR; INTRALESIONAL; INTRAMUSCULAR; SOFT TISSUE at 11:28

## 2025-02-04 NOTE — PROGRESS NOTES
79-year-old female have seen in remote past presents with acute episode of left-sided knee pain she states she had an episode where her knee gave her significant pain she felt like it was popping and clicking she was prescribed a Medrol Dosepak and a knee brace she is feeling some better now but still has stiffness and pain in her knee and difficulty bearing weight.    Location of pain: Anterior and medial aspect knee left  Quality of pain: Severe pain initially little bit better now  Modifying factors: Worse when she walks on uneven ground or stands for prolonged period time  Associated signs and symptoms: 70 episodes of locking and catching in her knee.  Previous treatment: She had a Medrol Dosepak and a hinged knee brace        The patient's past medical history, family history, social history, and review of systems were documented on the patient's medical intake form.  The medical intake form was reviewed and scanned into the electronic medical record for future use.  History is otherwise negative except as stated in the HPI.    Physical exam    General: Alert and oriented to place, person, and time.  No acute distress and breathing comfortably; pleasant and cooperative with the examination.  HEENT: Head is normocephalic and atraumatic.  Neck: Supple, no visible swelling.  Cardiovascular: Good perfusion to the affected extremity.  Lungs: No audible wheezing or labored breathing.  Abdomen: Nondistended  HEME/Lymph : No visible abnormalities bilateral lower extremity    Extremity:  The affected knee was examined and inspected and was tender to touch along the medial aspect.  The patient has catching/locking and occasional mechanical symptoms.  The skin was intact without breakdown or open wounds.  There was a mild Shila exam seen with mild evidence of instability and weakness in the collateral ligaments with laxity to varus valgus stress and in the anterior posterior plane.  There was a negative Lachman's  test, pivot shift test, and posterior drawer sign.  There was no foot drop, numbness or tingling.  Sensation, reflexes, and pulses in the foot and ankle were present.  There was an effusion but range of motion was good and straight leg raise testing was normal.   The patient had the ability to bear weight but with discomfort.  The patient's gait was antalgic secondary to the discomfort. Knee range of motion was 5-115    Diagnostics:      XR knee left 4+ views    Result Date: 2/4/2025  Interpreted By:  Francisca Garcia, STUDY: XR KNEE LEFT 4+ VIEWS; 2/4/2025 11:11 am   INDICATION: Signs/Symptoms:pain.   ACCESSION NUMBER(S): [id number]   ORDERING CLINICIAN: FRANCISCA GARCIA   FINDINGS: Left knee weightbearing four views. Moderate to severe knee arthritis with joint space narrowing osteophyte formation subchondral sclerosis moderate size knee effusion no signs of fracture dislocation or other bony abnormality     Signed by: Francisca Garcia 2/4/2025 11:43 AM Dictation workstation:   [id number]    CT knee left wo IV contrast    Result Date: 1/11/2025  STUDY: CT Extremity; Completed Time:  01/11/2025, 6:27 PM INDICATION: Severe left knee pain secondary to a pop. COMPARISON: XR knee 06/20/2023. ACCESSION NUMBER(S): [id number] ORDERING CLINICIAN: ADRIAN OVALLES TECHNIQUE:  Thin section axial images were obtained through the Left knee  without intravenous contrast.  Orthogonal reconstructed images were obtained and reviewed.  Automated mA/kV exposure control was utilized and patient examination was performed in strict accordance with principles of ALARA. FINDINGS:  No acute displaced fracture or dislocation.  Moderate to severe compartmental osteoarthritis.  Moderate to large joint effusion. The anterior cruciate ligament ligament is not well seen.  The posterior cruciate ligament appears grossly intact.  The medial and lateral collateral ligament complexes appear grossly intact. Approximately 4 similar cyst along  the proximal popliteus tendon. The patellar and quadriceps tendon are intact.  The remaining soft tissues show no concerning CT finding.    No acute osseous abnormality. Moderate to large joint effusion. Approximately 4 cm cyst along the proximal popliteus tendon. Evaluation of the anterior cruciate ligament is limited on this exam but a full-thickness tear is suspected.  This may be acute or chronic. Signed by Aniket Ward MD    Vascular US lower extremity venous duplex left    Result Date: 1/11/2025  STUDY: Left Lower Extremity Venous Doppler Ultrasound; 1/11/2025 6:00 PM. INDICATION: Left lower extremity pain x2 days. History of breast cancer. HISTORY: Currently not on chemotherapy. COMPARISON: None available. ACCESSION NUMBER(S): [id number] ORDERING CLINICIAN: ADRIAN OVALLES TECHNIQUE:  Real-time grayscale imaging, color Doppler flow imaging, and spectral Doppler imaging of the left lower extremity veins was performed. FINDINGS: The left common femoral, profunda, femoral, and popliteal veins demonstrated normal compressibility, normal phasic venous flow and normal response to augmentation. There is no evidence for echogenic thrombi. The visualized deep calf veins are patent.  The contralateral common femoral vein is free of thrombosis.    No evidence for DVT within the left lower extremity. Signed by Ken Weinstein MD         Procedures  See dictated procedure note    Assessment:  Acute exacerbation knee arthritis left    Treatment plan:  1.  The natural history of the condition and its associated treatment alternatives including surgical and nonsurgical options were discussed with the patient at length.  2.  Patient with significant impairment of body function related to her left knee we discussed surgical nonsurgical option she like to proceed with cortisone injection which is performed with her consent without complication.  Patient understands the cortisone may provide temporary relief how much it helps  her how long it lasts is unpredictable.  Cortisone can be repeated after 3 months if symptoms return.  3. [   ]  4.  All of the patient's questions were answered.    Orders Placed This Encounter    Inj/Asp: left knee    XR knee left 4+ views       This note was prepared using voice recognition software.  The details of this note are correct and have been reviewed, and corrected to the best of my ability.  Some grammatical areas may persist related to the Dragon software    Pierre Garcia MD  Senior Attending Physician  [facility]    [phone]    Inj/Asp: left knee on 2/4/2025 11:28 AM  Indications: pain and diagnostic evaluation  Details: 22 G needle, anteromedial approach  Medications: 2 mL betamethasone acet,sod phos 6 mg/mL; 4 mL lidocaine 10 mg/mL (1 %)  Outcome: tolerated well, no immediate complications  Procedure, treatment alternatives, risks and benefits explained, specific risks discussed. Consent was given by the patient. Immediately prior to procedure a time out was called to verify the correct patient, procedure, equipment, support staff and site/side marked as required. Patient was prepped and draped in the usual sterile fashion.

## 2025-02-05 RX ORDER — LIDOCAINE HYDROCHLORIDE 10 MG/ML
4 INJECTION, SOLUTION INFILTRATION; PERINEURAL
Status: COMPLETED | OUTPATIENT
Start: 2025-02-04 | End: 2025-02-04

## 2025-02-05 RX ORDER — BETAMETHASONE SODIUM PHOSPHATE AND BETAMETHASONE ACETATE 3; 3 MG/ML; MG/ML
2 INJECTION, SUSPENSION INTRA-ARTICULAR; INTRALESIONAL; INTRAMUSCULAR; SOFT TISSUE
Status: COMPLETED | OUTPATIENT
Start: 2025-02-04 | End: 2025-02-04

## 2025-08-25 ENCOUNTER — HOSPITAL ENCOUNTER (OUTPATIENT)
Dept: RADIOLOGY | Facility: HOSPITAL | Age: 80
Discharge: HOME | End: 2025-08-25
Payer: MEDICARE

## 2025-08-25 VITALS — HEIGHT: 67 IN | BODY MASS INDEX: 30.53 KG/M2

## 2025-08-25 DIAGNOSIS — Z12.31 ENCOUNTER FOR SCREENING MAMMOGRAM FOR MALIGNANT NEOPLASM OF BREAST: ICD-10-CM

## 2025-08-25 PROCEDURE — 77067 SCR MAMMO BI INCL CAD: CPT | Performed by: RADIOLOGY

## 2025-08-25 PROCEDURE — 77063 BREAST TOMOSYNTHESIS BI: CPT | Performed by: RADIOLOGY

## 2025-08-25 PROCEDURE — 77067 SCR MAMMO BI INCL CAD: CPT
